# Patient Record
Sex: FEMALE | Race: BLACK OR AFRICAN AMERICAN | NOT HISPANIC OR LATINO | Employment: UNEMPLOYED | URBAN - METROPOLITAN AREA
[De-identification: names, ages, dates, MRNs, and addresses within clinical notes are randomized per-mention and may not be internally consistent; named-entity substitution may affect disease eponyms.]

---

## 2017-02-04 ENCOUNTER — COMMUNICATION - HEALTHEAST (OUTPATIENT)
Dept: INTERNAL MEDICINE | Facility: CLINIC | Age: 47
End: 2017-02-04

## 2017-02-09 ENCOUNTER — COMMUNICATION - HEALTHEAST (OUTPATIENT)
Dept: INTERNAL MEDICINE | Facility: CLINIC | Age: 47
End: 2017-02-09

## 2017-03-14 ENCOUNTER — OFFICE VISIT - HEALTHEAST (OUTPATIENT)
Dept: INTERNAL MEDICINE | Facility: CLINIC | Age: 47
End: 2017-03-14

## 2017-03-14 ENCOUNTER — COMMUNICATION - HEALTHEAST (OUTPATIENT)
Dept: INTERNAL MEDICINE | Facility: CLINIC | Age: 47
End: 2017-03-14

## 2017-03-14 DIAGNOSIS — J32.9 SINUSITIS: ICD-10-CM

## 2017-03-14 ASSESSMENT — MIFFLIN-ST. JEOR: SCORE: 1324.39

## 2017-04-26 ENCOUNTER — COMMUNICATION - HEALTHEAST (OUTPATIENT)
Dept: INTERNAL MEDICINE | Facility: CLINIC | Age: 47
End: 2017-04-26

## 2017-04-26 DIAGNOSIS — G47.00 INSOMNIA: ICD-10-CM

## 2017-05-24 ENCOUNTER — AMBULATORY - HEALTHEAST (OUTPATIENT)
Dept: INTERNAL MEDICINE | Facility: CLINIC | Age: 47
End: 2017-05-24

## 2017-05-24 DIAGNOSIS — Z79.899 ENCOUNTER FOR LONG-TERM (CURRENT) USE OF OTHER MEDICATIONS: ICD-10-CM

## 2017-05-24 DIAGNOSIS — E55.9 VITAMIN D DEFICIENCY: ICD-10-CM

## 2017-05-24 DIAGNOSIS — D64.9 ANEMIA, UNSPECIFIED: ICD-10-CM

## 2017-05-24 DIAGNOSIS — E78.5 HYPERLIPEMIA: ICD-10-CM

## 2017-05-25 ENCOUNTER — RECORDS - HEALTHEAST (OUTPATIENT)
Dept: MAMMOGRAPHY | Facility: CLINIC | Age: 47
End: 2017-05-25

## 2017-05-25 ENCOUNTER — OFFICE VISIT - HEALTHEAST (OUTPATIENT)
Dept: INTERNAL MEDICINE | Facility: CLINIC | Age: 47
End: 2017-05-25

## 2017-05-25 DIAGNOSIS — E55.9 VITAMIN D DEFICIENCY: ICD-10-CM

## 2017-05-25 DIAGNOSIS — D64.9 ANEMIA, UNSPECIFIED: ICD-10-CM

## 2017-05-25 DIAGNOSIS — Z79.899 ENCOUNTER FOR LONG-TERM (CURRENT) USE OF OTHER MEDICATIONS: ICD-10-CM

## 2017-05-25 DIAGNOSIS — E78.5 HYPERLIPEMIA: ICD-10-CM

## 2017-05-25 DIAGNOSIS — Z12.31 ENCOUNTER FOR SCREENING MAMMOGRAM FOR MALIGNANT NEOPLASM OF BREAST: ICD-10-CM

## 2017-05-25 DIAGNOSIS — F43.10 POSTTRAUMATIC STRESS DISORDER: ICD-10-CM

## 2017-05-25 DIAGNOSIS — Z00.00 ENCOUNTER FOR ANNUAL PHYSICAL EXAM: ICD-10-CM

## 2017-05-25 LAB
CHOLEST SERPL-MCNC: 201 MG/DL
FASTING STATUS PATIENT QL REPORTED: NO
HDLC SERPL-MCNC: 52 MG/DL
LDLC SERPL CALC-MCNC: 141 MG/DL
TRIGL SERPL-MCNC: 42 MG/DL

## 2017-05-25 ASSESSMENT — MIFFLIN-ST. JEOR: SCORE: 1302.16

## 2017-05-26 ENCOUNTER — COMMUNICATION - HEALTHEAST (OUTPATIENT)
Dept: INTERNAL MEDICINE | Facility: CLINIC | Age: 47
End: 2017-05-26

## 2017-06-23 ENCOUNTER — COMMUNICATION - HEALTHEAST (OUTPATIENT)
Dept: INTERNAL MEDICINE | Facility: CLINIC | Age: 47
End: 2017-06-23

## 2017-06-23 DIAGNOSIS — F32.A DEPRESSION: ICD-10-CM

## 2017-10-13 ENCOUNTER — OFFICE VISIT - HEALTHEAST (OUTPATIENT)
Dept: INTERNAL MEDICINE | Facility: CLINIC | Age: 47
End: 2017-10-13

## 2017-10-13 DIAGNOSIS — D50.9 IRON DEFICIENCY ANEMIA: ICD-10-CM

## 2017-10-13 ASSESSMENT — MIFFLIN-ST. JEOR: SCORE: 1307.15

## 2017-11-22 ENCOUNTER — COMMUNICATION - HEALTHEAST (OUTPATIENT)
Dept: SCHEDULING | Facility: CLINIC | Age: 47
End: 2017-11-22

## 2017-11-22 DIAGNOSIS — J32.9 SINUSITIS: ICD-10-CM

## 2017-11-29 ENCOUNTER — COMMUNICATION - HEALTHEAST (OUTPATIENT)
Dept: INTERNAL MEDICINE | Facility: CLINIC | Age: 47
End: 2017-11-29

## 2017-11-29 DIAGNOSIS — G47.00 INSOMNIA: ICD-10-CM

## 2018-04-16 ENCOUNTER — COMMUNICATION - HEALTHEAST (OUTPATIENT)
Dept: INTERNAL MEDICINE | Facility: CLINIC | Age: 48
End: 2018-04-16

## 2018-04-16 DIAGNOSIS — G47.00 INSOMNIA: ICD-10-CM

## 2018-06-08 ENCOUNTER — OFFICE VISIT - HEALTHEAST (OUTPATIENT)
Dept: INTERNAL MEDICINE | Facility: CLINIC | Age: 48
End: 2018-06-08

## 2018-06-08 DIAGNOSIS — R35.0 URINARY FREQUENCY: ICD-10-CM

## 2018-06-08 DIAGNOSIS — Z12.4 SCREENING FOR CERVICAL CANCER: ICD-10-CM

## 2018-06-08 DIAGNOSIS — D50.9 IRON DEFICIENCY ANEMIA: ICD-10-CM

## 2018-06-08 DIAGNOSIS — E78.5 DYSLIPIDEMIA: ICD-10-CM

## 2018-06-08 DIAGNOSIS — Z51.81 MEDICATION MONITORING ENCOUNTER: ICD-10-CM

## 2018-06-08 LAB
ALBUMIN UR-MCNC: NEGATIVE MG/DL
APPEARANCE UR: CLEAR
BACTERIA #/AREA URNS HPF: ABNORMAL HPF
BILIRUB UR QL STRIP: NEGATIVE
COLOR UR AUTO: YELLOW
GLUCOSE UR STRIP-MCNC: NEGATIVE MG/DL
HGB UR QL STRIP: ABNORMAL
KETONES UR STRIP-MCNC: NEGATIVE MG/DL
LEUKOCYTE ESTERASE UR QL STRIP: NEGATIVE
NITRATE UR QL: NEGATIVE
PH UR STRIP: 7 [PH] (ref 5–8)
RBC #/AREA URNS AUTO: ABNORMAL HPF
SP GR UR STRIP: 1.01 (ref 1–1.03)
SQUAMOUS #/AREA URNS AUTO: ABNORMAL LPF
UROBILINOGEN UR STRIP-ACNC: ABNORMAL
WBC #/AREA URNS AUTO: ABNORMAL HPF

## 2018-06-08 ASSESSMENT — MIFFLIN-ST. JEOR: SCORE: 1260.65

## 2018-06-13 ENCOUNTER — COMMUNICATION - HEALTHEAST (OUTPATIENT)
Dept: INTERNAL MEDICINE | Facility: CLINIC | Age: 48
End: 2018-06-13

## 2018-07-02 ENCOUNTER — COMMUNICATION - HEALTHEAST (OUTPATIENT)
Dept: INTERNAL MEDICINE | Facility: CLINIC | Age: 48
End: 2018-07-02

## 2018-07-02 DIAGNOSIS — F32.A DEPRESSION: ICD-10-CM

## 2018-07-13 ENCOUNTER — COMMUNICATION - HEALTHEAST (OUTPATIENT)
Dept: INTERNAL MEDICINE | Facility: CLINIC | Age: 48
End: 2018-07-13

## 2018-07-13 DIAGNOSIS — G47.00 INSOMNIA: ICD-10-CM

## 2018-07-18 ENCOUNTER — COMMUNICATION - HEALTHEAST (OUTPATIENT)
Dept: INTERNAL MEDICINE | Facility: CLINIC | Age: 48
End: 2018-07-18

## 2018-07-18 ENCOUNTER — COMMUNICATION - HEALTHEAST (OUTPATIENT)
Dept: SCHEDULING | Facility: CLINIC | Age: 48
End: 2018-07-18

## 2018-07-18 DIAGNOSIS — J01.90 ACUTE NON-RECURRENT SINUSITIS, UNSPECIFIED LOCATION: ICD-10-CM

## 2018-10-10 ENCOUNTER — COMMUNICATION - HEALTHEAST (OUTPATIENT)
Dept: INTERNAL MEDICINE | Facility: CLINIC | Age: 48
End: 2018-10-10

## 2018-10-11 ENCOUNTER — COMMUNICATION - HEALTHEAST (OUTPATIENT)
Dept: INTERNAL MEDICINE | Facility: CLINIC | Age: 48
End: 2018-10-11

## 2019-04-05 ENCOUNTER — COMMUNICATION - HEALTHEAST (OUTPATIENT)
Dept: INTERNAL MEDICINE | Facility: CLINIC | Age: 49
End: 2019-04-05

## 2019-04-05 DIAGNOSIS — G47.00 INSOMNIA: ICD-10-CM

## 2019-05-10 ENCOUNTER — AMBULATORY - HEALTHEAST (OUTPATIENT)
Dept: OTHER | Facility: CLINIC | Age: 49
End: 2019-05-10

## 2019-06-10 ENCOUNTER — AMBULATORY - HEALTHEAST (OUTPATIENT)
Dept: OTHER | Facility: CLINIC | Age: 49
End: 2019-06-10

## 2019-07-02 ENCOUNTER — COMMUNICATION - HEALTHEAST (OUTPATIENT)
Dept: INTERNAL MEDICINE | Facility: CLINIC | Age: 49
End: 2019-07-02

## 2019-07-02 DIAGNOSIS — F32.A DEPRESSION: ICD-10-CM

## 2019-07-02 DIAGNOSIS — G47.00 INSOMNIA: ICD-10-CM

## 2019-09-19 ENCOUNTER — DOCUMENTATION ONLY (OUTPATIENT)
Dept: OTHER | Facility: CLINIC | Age: 49
End: 2019-09-19

## 2019-09-19 ENCOUNTER — COMMUNICATION - HEALTHEAST (OUTPATIENT)
Dept: INTERNAL MEDICINE | Facility: CLINIC | Age: 49
End: 2019-09-19

## 2019-09-19 ENCOUNTER — AMBULATORY - HEALTHEAST (OUTPATIENT)
Dept: OTHER | Facility: CLINIC | Age: 49
End: 2019-09-19

## 2019-10-28 ENCOUNTER — OFFICE VISIT - HEALTHEAST (OUTPATIENT)
Dept: INTERNAL MEDICINE | Facility: CLINIC | Age: 49
End: 2019-10-28

## 2019-10-28 ENCOUNTER — RECORDS - HEALTHEAST (OUTPATIENT)
Dept: MAMMOGRAPHY | Facility: CLINIC | Age: 49
End: 2019-10-28

## 2019-10-28 DIAGNOSIS — Z12.11 COLON CANCER SCREENING: ICD-10-CM

## 2019-10-28 DIAGNOSIS — D50.0 IRON DEFICIENCY ANEMIA DUE TO CHRONIC BLOOD LOSS: ICD-10-CM

## 2019-10-28 DIAGNOSIS — D64.9 ANEMIA, UNSPECIFIED TYPE: ICD-10-CM

## 2019-10-28 DIAGNOSIS — Z12.11 SCREEN FOR COLON CANCER: ICD-10-CM

## 2019-10-28 DIAGNOSIS — Z00.00 MEDICARE ANNUAL WELLNESS VISIT, SUBSEQUENT: ICD-10-CM

## 2019-10-28 DIAGNOSIS — J01.90 ACUTE NON-RECURRENT SINUSITIS, UNSPECIFIED LOCATION: ICD-10-CM

## 2019-10-28 DIAGNOSIS — Z11.4 SCREENING FOR HIV (HUMAN IMMUNODEFICIENCY VIRUS): ICD-10-CM

## 2019-10-28 DIAGNOSIS — E78.00 HYPERCHOLESTEROLEMIA: ICD-10-CM

## 2019-10-28 DIAGNOSIS — Z12.31 ENCOUNTER FOR SCREENING MAMMOGRAM FOR MALIGNANT NEOPLASM OF BREAST: ICD-10-CM

## 2019-10-28 DIAGNOSIS — F33.42 MAJOR DEPRESSIVE DISORDER, RECURRENT EPISODE, IN FULL REMISSION (H): ICD-10-CM

## 2019-10-28 DIAGNOSIS — Z51.81 ENCOUNTER FOR THERAPEUTIC DRUG MONITORING: ICD-10-CM

## 2019-10-28 LAB
ALBUMIN SERPL-MCNC: 3.8 G/DL (ref 3.5–5)
ALBUMIN UR-MCNC: NEGATIVE MG/DL
ALP SERPL-CCNC: 60 U/L (ref 45–120)
ALT SERPL W P-5'-P-CCNC: 20 U/L (ref 0–45)
ANION GAP SERPL CALCULATED.3IONS-SCNC: 7 MMOL/L (ref 5–18)
APPEARANCE UR: CLEAR
AST SERPL W P-5'-P-CCNC: 17 U/L (ref 0–40)
BACTERIA #/AREA URNS HPF: ABNORMAL HPF
BILIRUB SERPL-MCNC: 0.2 MG/DL (ref 0–1)
BILIRUB UR QL STRIP: NEGATIVE
BUN SERPL-MCNC: 16 MG/DL (ref 8–22)
CALCIUM SERPL-MCNC: 8.8 MG/DL (ref 8.5–10.5)
CHLORIDE BLD-SCNC: 107 MMOL/L (ref 98–107)
CHOLEST SERPL-MCNC: 210 MG/DL
CO2 SERPL-SCNC: 27 MMOL/L (ref 22–31)
COLOR UR AUTO: YELLOW
CREAT SERPL-MCNC: 0.79 MG/DL (ref 0.6–1.1)
ERYTHROCYTE [DISTWIDTH] IN BLOOD BY AUTOMATED COUNT: 10.8 % (ref 11–14.5)
FASTING STATUS PATIENT QL REPORTED: NO
FERRITIN SERPL-MCNC: 126 NG/ML (ref 10–130)
GFR SERPL CREATININE-BSD FRML MDRD: >60 ML/MIN/1.73M2
GLUCOSE BLD-MCNC: 87 MG/DL (ref 70–125)
GLUCOSE UR STRIP-MCNC: NEGATIVE MG/DL
HCT VFR BLD AUTO: 37.4 % (ref 35–47)
HDLC SERPL-MCNC: 58 MG/DL
HGB BLD-MCNC: 12.3 G/DL (ref 12–16)
HGB UR QL STRIP: ABNORMAL
HIV 1+2 AB+HIV1 P24 AG SERPL QL IA: NEGATIVE
KETONES UR STRIP-MCNC: NEGATIVE MG/DL
LDLC SERPL CALC-MCNC: 131 MG/DL
LEUKOCYTE ESTERASE UR QL STRIP: NEGATIVE
MCH RBC QN AUTO: 29.6 PG (ref 27–34)
MCHC RBC AUTO-ENTMCNC: 32.9 G/DL (ref 32–36)
MCV RBC AUTO: 90 FL (ref 80–100)
NITRATE UR QL: NEGATIVE
PH UR STRIP: 5.5 [PH] (ref 5–8)
PLATELET # BLD AUTO: 319 THOU/UL (ref 140–440)
PMV BLD AUTO: 7 FL (ref 7–10)
POTASSIUM BLD-SCNC: 4.1 MMOL/L (ref 3.5–5)
PROT SERPL-MCNC: 7 G/DL (ref 6–8)
RBC # BLD AUTO: 4.16 MILL/UL (ref 3.8–5.4)
RBC #/AREA URNS AUTO: ABNORMAL HPF
SODIUM SERPL-SCNC: 141 MMOL/L (ref 136–145)
SP GR UR STRIP: >=1.03 (ref 1–1.03)
SQUAMOUS #/AREA URNS AUTO: ABNORMAL LPF
TRIGL SERPL-MCNC: 104 MG/DL
UROBILINOGEN UR STRIP-ACNC: ABNORMAL
WBC #/AREA URNS AUTO: ABNORMAL HPF
WBC: 3.3 THOU/UL (ref 4–11)

## 2019-10-28 ASSESSMENT — ANXIETY QUESTIONNAIRES
2. NOT BEING ABLE TO STOP OR CONTROL WORRYING: SEVERAL DAYS
1. FEELING NERVOUS, ANXIOUS, OR ON EDGE: SEVERAL DAYS
7. FEELING AFRAID AS IF SOMETHING AWFUL MIGHT HAPPEN: SEVERAL DAYS
GAD7 TOTAL SCORE: 7
3. WORRYING TOO MUCH ABOUT DIFFERENT THINGS: SEVERAL DAYS
6. BECOMING EASILY ANNOYED OR IRRITABLE: SEVERAL DAYS
IF YOU CHECKED OFF ANY PROBLEMS ON THIS QUESTIONNAIRE, HOW DIFFICULT HAVE THESE PROBLEMS MADE IT FOR YOU TO DO YOUR WORK, TAKE CARE OF THINGS AT HOME, OR GET ALONG WITH OTHER PEOPLE: NOT DIFFICULT AT ALL
4. TROUBLE RELAXING: SEVERAL DAYS
5. BEING SO RESTLESS THAT IT IS HARD TO SIT STILL: SEVERAL DAYS

## 2019-10-28 ASSESSMENT — MIFFLIN-ST. JEOR: SCORE: 1303.75

## 2019-10-28 ASSESSMENT — PATIENT HEALTH QUESTIONNAIRE - PHQ9: SUM OF ALL RESPONSES TO PHQ QUESTIONS 1-9: 6

## 2019-10-29 ENCOUNTER — COMMUNICATION - HEALTHEAST (OUTPATIENT)
Dept: INTERNAL MEDICINE | Facility: CLINIC | Age: 49
End: 2019-10-29

## 2019-10-31 ENCOUNTER — COMMUNICATION - HEALTHEAST (OUTPATIENT)
Dept: INTERNAL MEDICINE | Facility: CLINIC | Age: 49
End: 2019-10-31

## 2020-02-06 ENCOUNTER — COMMUNICATION - HEALTHEAST (OUTPATIENT)
Dept: SCHEDULING | Facility: CLINIC | Age: 50
End: 2020-02-06

## 2020-02-06 ENCOUNTER — COMMUNICATION - HEALTHEAST (OUTPATIENT)
Dept: INTERNAL MEDICINE | Facility: CLINIC | Age: 50
End: 2020-02-06

## 2020-02-06 ENCOUNTER — OFFICE VISIT - HEALTHEAST (OUTPATIENT)
Dept: INTERNAL MEDICINE | Facility: CLINIC | Age: 50
End: 2020-02-06

## 2020-02-06 DIAGNOSIS — R09.81 CONGESTION OF PARANASAL SINUS: ICD-10-CM

## 2020-02-06 ASSESSMENT — PATIENT HEALTH QUESTIONNAIRE - PHQ9: SUM OF ALL RESPONSES TO PHQ QUESTIONS 1-9: 1

## 2020-02-06 ASSESSMENT — MIFFLIN-ST. JEOR: SCORE: 1312.82

## 2020-02-13 ENCOUNTER — COMMUNICATION - HEALTHEAST (OUTPATIENT)
Dept: INTERNAL MEDICINE | Facility: CLINIC | Age: 50
End: 2020-02-13

## 2020-02-13 DIAGNOSIS — J01.90 ACUTE NON-RECURRENT SINUSITIS, UNSPECIFIED LOCATION: ICD-10-CM

## 2020-07-02 ENCOUNTER — COMMUNICATION - HEALTHEAST (OUTPATIENT)
Dept: INTERNAL MEDICINE | Facility: CLINIC | Age: 50
End: 2020-07-02

## 2020-07-02 DIAGNOSIS — G47.00 INSOMNIA: ICD-10-CM

## 2020-07-02 DIAGNOSIS — F32.A DEPRESSION: ICD-10-CM

## 2020-10-02 ENCOUNTER — COMMUNICATION - HEALTHEAST (OUTPATIENT)
Dept: INTERNAL MEDICINE | Facility: CLINIC | Age: 50
End: 2020-10-02

## 2020-10-02 DIAGNOSIS — F32.A DEPRESSION: ICD-10-CM

## 2020-10-23 ENCOUNTER — COMMUNICATION - HEALTHEAST (OUTPATIENT)
Dept: INTERNAL MEDICINE | Facility: CLINIC | Age: 50
End: 2020-10-23

## 2020-10-23 DIAGNOSIS — F32.A DEPRESSION: ICD-10-CM

## 2020-11-03 ENCOUNTER — COMMUNICATION - HEALTHEAST (OUTPATIENT)
Dept: INTERNAL MEDICINE | Facility: CLINIC | Age: 50
End: 2020-11-03

## 2020-11-03 DIAGNOSIS — F32.A DEPRESSION: ICD-10-CM

## 2020-12-15 ENCOUNTER — OFFICE VISIT - HEALTHEAST (OUTPATIENT)
Dept: INTERNAL MEDICINE | Facility: CLINIC | Age: 50
End: 2020-12-15

## 2020-12-15 DIAGNOSIS — G47.00 INSOMNIA: ICD-10-CM

## 2020-12-15 DIAGNOSIS — F33.42 MAJOR DEPRESSIVE DISORDER, RECURRENT EPISODE, IN FULL REMISSION (H): ICD-10-CM

## 2020-12-15 RX ORDER — TRAZODONE HYDROCHLORIDE 100 MG/1
300 TABLET ORAL AT BEDTIME
Qty: 270 TABLET | Refills: 3 | Status: SHIPPED | OUTPATIENT
Start: 2020-12-15 | End: 2021-12-31

## 2021-01-07 ENCOUNTER — COMMUNICATION - HEALTHEAST (OUTPATIENT)
Dept: INTERNAL MEDICINE | Facility: CLINIC | Age: 51
End: 2021-01-07

## 2021-01-07 DIAGNOSIS — F32.A DEPRESSION: ICD-10-CM

## 2021-01-08 RX ORDER — VENLAFAXINE HYDROCHLORIDE 150 MG/1
CAPSULE, EXTENDED RELEASE ORAL
Qty: 90 CAPSULE | Refills: 3 | Status: SHIPPED | OUTPATIENT
Start: 2021-01-08 | End: 2022-01-11

## 2021-02-02 ENCOUNTER — OFFICE VISIT - HEALTHEAST (OUTPATIENT)
Dept: INTERNAL MEDICINE | Facility: CLINIC | Age: 51
End: 2021-02-02

## 2021-02-02 DIAGNOSIS — D50.9 IRON DEFICIENCY ANEMIA, UNSPECIFIED IRON DEFICIENCY ANEMIA TYPE: ICD-10-CM

## 2021-02-02 DIAGNOSIS — F51.01 PRIMARY INSOMNIA: ICD-10-CM

## 2021-02-02 DIAGNOSIS — J01.91 ACUTE RECURRENT SINUSITIS, UNSPECIFIED LOCATION: ICD-10-CM

## 2021-02-02 DIAGNOSIS — Z51.81 ENCOUNTER FOR THERAPEUTIC DRUG MONITORING: ICD-10-CM

## 2021-02-02 DIAGNOSIS — Z12.31 VISIT FOR SCREENING MAMMOGRAM: ICD-10-CM

## 2021-02-02 DIAGNOSIS — E78.00 HYPERCHOLESTEROLEMIA: ICD-10-CM

## 2021-02-02 DIAGNOSIS — F43.10 POSTTRAUMATIC STRESS DISORDER: ICD-10-CM

## 2021-02-02 DIAGNOSIS — Z12.11 SCREENING FOR COLON CANCER: ICD-10-CM

## 2021-02-02 DIAGNOSIS — Z00.00 MEDICARE ANNUAL WELLNESS VISIT, SUBSEQUENT: ICD-10-CM

## 2021-02-02 DIAGNOSIS — F33.42 MAJOR DEPRESSIVE DISORDER, RECURRENT EPISODE, IN FULL REMISSION (H): ICD-10-CM

## 2021-02-02 DIAGNOSIS — E55.9 VITAMIN D DEFICIENCY: ICD-10-CM

## 2021-02-02 LAB
ALBUMIN SERPL-MCNC: 4.1 G/DL (ref 3.5–5)
ALBUMIN UR-MCNC: NEGATIVE MG/DL
ALP SERPL-CCNC: 76 U/L (ref 45–120)
ALT SERPL W P-5'-P-CCNC: 37 U/L (ref 0–45)
ANION GAP SERPL CALCULATED.3IONS-SCNC: 9 MMOL/L (ref 5–18)
APPEARANCE UR: CLEAR
AST SERPL W P-5'-P-CCNC: 26 U/L (ref 0–40)
BACTERIA #/AREA URNS HPF: ABNORMAL HPF
BILIRUB SERPL-MCNC: 0.2 MG/DL (ref 0–1)
BILIRUB UR QL STRIP: NEGATIVE
BUN SERPL-MCNC: 7 MG/DL (ref 8–22)
CALCIUM SERPL-MCNC: 9 MG/DL (ref 8.5–10.5)
CHLORIDE BLD-SCNC: 107 MMOL/L (ref 98–107)
CHOLEST SERPL-MCNC: 231 MG/DL
CO2 SERPL-SCNC: 24 MMOL/L (ref 22–31)
COLOR UR AUTO: YELLOW
CREAT SERPL-MCNC: 0.82 MG/DL (ref 0.6–1.1)
ERYTHROCYTE [DISTWIDTH] IN BLOOD BY AUTOMATED COUNT: 12.4 % (ref 11–14.5)
FASTING STATUS PATIENT QL REPORTED: NO
FERRITIN SERPL-MCNC: 121 NG/ML (ref 10–130)
GFR SERPL CREATININE-BSD FRML MDRD: >60 ML/MIN/1.73M2
GLUCOSE BLD-MCNC: 81 MG/DL (ref 70–125)
GLUCOSE UR STRIP-MCNC: NEGATIVE MG/DL
HCT VFR BLD AUTO: 41.1 % (ref 35–47)
HDLC SERPL-MCNC: 51 MG/DL
HGB BLD-MCNC: 12.8 G/DL (ref 12–16)
HGB UR QL STRIP: NEGATIVE
KETONES UR STRIP-MCNC: NEGATIVE MG/DL
LDLC SERPL CALC-MCNC: 148 MG/DL
LEUKOCYTE ESTERASE UR QL STRIP: NEGATIVE
MCH RBC QN AUTO: 28.4 PG (ref 27–34)
MCHC RBC AUTO-ENTMCNC: 31.1 G/DL (ref 32–36)
MCV RBC AUTO: 91 FL (ref 80–100)
MUCOUS THREADS #/AREA URNS LPF: ABNORMAL LPF
NITRATE UR QL: NEGATIVE
PH UR STRIP: 5.5 [PH] (ref 5–8)
PLATELET # BLD AUTO: 337 THOU/UL (ref 140–440)
PMV BLD AUTO: 9 FL (ref 7–10)
POTASSIUM BLD-SCNC: 3.9 MMOL/L (ref 3.5–5)
PROT SERPL-MCNC: 7.8 G/DL (ref 6–8)
RBC # BLD AUTO: 4.5 MILL/UL (ref 3.8–5.4)
RBC #/AREA URNS AUTO: ABNORMAL HPF
SODIUM SERPL-SCNC: 140 MMOL/L (ref 136–145)
SP GR UR STRIP: >=1.03 (ref 1–1.03)
SQUAMOUS #/AREA URNS AUTO: ABNORMAL LPF
TRIGL SERPL-MCNC: 160 MG/DL
UROBILINOGEN UR STRIP-ACNC: ABNORMAL
WBC #/AREA URNS AUTO: ABNORMAL HPF
WBC: 3.4 THOU/UL (ref 4–11)

## 2021-02-02 ASSESSMENT — MIFFLIN-ST. JEOR: SCORE: 1417.37

## 2021-02-03 LAB — 25(OH)D3 SERPL-MCNC: 29.4 NG/ML (ref 30–80)

## 2021-02-04 ENCOUNTER — COMMUNICATION - HEALTHEAST (OUTPATIENT)
Dept: INTERNAL MEDICINE | Facility: CLINIC | Age: 51
End: 2021-02-04

## 2021-05-11 ENCOUNTER — RECORDS - HEALTHEAST (OUTPATIENT)
Dept: ADMINISTRATIVE | Facility: OTHER | Age: 51
End: 2021-05-11

## 2021-05-26 ASSESSMENT — PATIENT HEALTH QUESTIONNAIRE - PHQ9: SUM OF ALL RESPONSES TO PHQ QUESTIONS 1-9: 6

## 2021-05-27 ASSESSMENT — PATIENT HEALTH QUESTIONNAIRE - PHQ9: SUM OF ALL RESPONSES TO PHQ QUESTIONS 1-9: 1

## 2021-05-27 NOTE — TELEPHONE ENCOUNTER
Refill Approved    Rx renewed per Medication Renewal Policy. Medication was last renewed on 7/13/18.    Maria A Green, [facility] Triage/Med Refill 4/5/2019     Requested Prescriptions   Pending Prescriptions Disp Refills     traZODone (DESYREL) 100 MG tablet [Pharmacy Med Name: TRAZODONE HCL 100MG TABS] 180 tablet 2     Sig: TAKE 2 TABLETS BY MOUTH DAILY AT BEDTIME    Tricyclics/Misc Antidepressant/Antianxiety Meds Refill Protocol Passed - 4/5/2019  3:18 AM       Passed - PCP or prescribing provider visit in last year    Last office visit with prescriber/PCP: 10/13/2017 Ronnie Bishop MD OR same dept: Visit date not found OR same specialty: 10/13/2017 Ronnie Bishop MD  Last physical: 6/8/2018 Last MTM visit: Visit date not found   Next visit within 3 mo: Visit date not found  Next physical within 3 mo: Visit date not found  Prescriber OR PCP: Ronnie Bishop MD  Last diagnosis associated with med order: 1. Insomnia  - traZODone (DESYREL) 100 MG tablet [Pharmacy Med Name: TRAZODONE HCL 100MG TABS]; TAKE 2 TABLETS BY MOUTH DAILY AT BEDTIME  Dispense: 180 tablet; Refill: 2    If protocol passes may refill for 12 months if within 3 months of last provider visit (or a total of 15 months).

## 2021-05-28 ASSESSMENT — ANXIETY QUESTIONNAIRES: GAD7 TOTAL SCORE: 7

## 2021-05-30 VITALS — BODY MASS INDEX: 29.98 KG/M2 | HEIGHT: 62 IN | WEIGHT: 162.9 LBS

## 2021-05-30 VITALS — BODY MASS INDEX: 29.11 KG/M2 | WEIGHT: 164.3 LBS | HEIGHT: 63 IN

## 2021-05-30 NOTE — TELEPHONE ENCOUNTER
RN cannot approve Refill Request    RN can NOT refill this medication Protocol failed and NO refill given.      Maria A Green, [facility] Triage/Med Refill 7/2/2019    Requested Prescriptions   Pending Prescriptions Disp Refills     venlafaxine (EFFEXOR-XR) 150 MG 24 hr capsule [Pharmacy Med Name: VENLAFAXINE HCL ER 150MG CP24] 90 capsule 3     Sig: TAKE ONE CAPSULE BY MOUTH EVERY DAY       Venlafaxine/Desvenlafaxine Refill Protocol Failed - 7/2/2019  3:17 AM        Failed - LFT or AST or ALT in last year     Albumin   Date Value Ref Range Status   05/25/2017 3.9 3.5 - 5.0 g/dL Final     Bilirubin, Total   Date Value Ref Range Status   05/25/2017 0.3 0.0 - 1.0 mg/dL Final     Alkaline Phosphatase   Date Value Ref Range Status   05/25/2017 57 45 - 120 U/L Final     AST   Date Value Ref Range Status   05/25/2017 19 0 - 40 U/L Final     ALT   Date Value Ref Range Status   05/25/2017 18 0 - 45 U/L Final     Protein, Total   Date Value Ref Range Status   05/25/2017 7.3 6.0 - 8.0 g/dL Final                Failed - Fasting lipid cascade in last year     Cholesterol   Date Value Ref Range Status   05/25/2017 201 (H) <=199 mg/dL Final     Triglycerides   Date Value Ref Range Status   05/25/2017 42 <=149 mg/dL Final     HDL Cholesterol   Date Value Ref Range Status   05/25/2017 52 >=50 mg/dL Final     LDL Calculated   Date Value Ref Range Status   05/25/2017 141 (H) <=129 mg/dL Final     Patient Fasting > 8hrs?   Date Value Ref Range Status   05/25/2017 No  Final             Failed - PCP or prescribing provider visit in last year     Last office visit with prescriber/PCP: 10/13/2017 Ronnie Bishop MD OR same dept: Visit date not found OR same specialty: 10/13/2017 Ronnie Bishop MD  Last physical: 6/8/2018 Last MTM visit: Visit date not found   Next visit within 3 mo: Visit date not found  Next physical within 3 mo: Visit date not found  Prescriber OR PCP: Ronnie Bishop MD  Last diagnosis associated with med order:  1. Depression  - venlafaxine (EFFEXOR-XR) 150 MG 24 hr capsule [Pharmacy Med Name: VENLAFAXINE HCL ER 150MG CP24]; TAKE ONE CAPSULE BY MOUTH EVERY DAY  Dispense: 90 capsule; Refill: 3    2. Insomnia  - traZODone (DESYREL) 100 MG tablet [Pharmacy Med Name: TRAZODONE HCL 100MG TABS]; TAKE 2 TABLETS BY MOUTH DAILY AT BEDTIME  Dispense: 180 tablet; Refill: 0    If protocol passes may refill for 12 months if within 3 months of last provider visit (or a total of 15 months).             Failed - Blood Pressure in last year     BP Readings from Last 1 Encounters:   06/08/18 108/80             traZODone (DESYREL) 100 MG tablet [Pharmacy Med Name: TRAZODONE HCL 100MG TABS] 180 tablet 0     Sig: TAKE 2 TABLETS BY MOUTH DAILY AT BEDTIME       Tricyclics/Misc Antidepressant/Antianxiety Meds Refill Protocol Failed - 7/2/2019  3:17 AM        Failed - PCP or prescribing provider visit in last year     Last office visit with prescriber/PCP: 10/13/2017 Ronnie Bishop MD OR same dept: Visit date not found OR same specialty: 10/13/2017 Ronnie Bishop MD  Last physical: 6/8/2018 Last MTM visit: Visit date not found   Next visit within 3 mo: Visit date not found  Next physical within 3 mo: Visit date not found  Prescriber OR PCP: Ronnie Bishop MD  Last diagnosis associated with med order: 1. Depression  - venlafaxine (EFFEXOR-XR) 150 MG 24 hr capsule [Pharmacy Med Name: VENLAFAXINE HCL ER 150MG CP24]; TAKE ONE CAPSULE BY MOUTH EVERY DAY  Dispense: 90 capsule; Refill: 3    2. Insomnia  - traZODone (DESYREL) 100 MG tablet [Pharmacy Med Name: TRAZODONE HCL 100MG TABS]; TAKE 2 TABLETS BY MOUTH DAILY AT BEDTIME  Dispense: 180 tablet; Refill: 0    If protocol passes may refill for 12 months if within 3 months of last provider visit (or a total of 15 months).

## 2021-05-31 VITALS — HEIGHT: 62 IN | BODY MASS INDEX: 30.18 KG/M2 | WEIGHT: 164 LBS

## 2021-06-01 VITALS — WEIGHT: 155.5 LBS | HEIGHT: 62 IN | BODY MASS INDEX: 28.61 KG/M2

## 2021-06-01 NOTE — TELEPHONE ENCOUNTER
Pt having skin revision , end of October and pt  Asking, is she can take this,  ARNICA vitamins, with her anti-depressants?    Please call pt back  [phone] MICAELA

## 2021-06-02 NOTE — PROGRESS NOTES
Assessment and Plan:     1. Medicare annual wellness visit, subsequent  Minnie is only 48 but has been on Medicare due to major depression and posttraumatic stress disorder.  He scores 4 on Mini-Mental and does not appear to have cognitive deficits.  Her health risk assessment was reviewed.  She is independent in activities of daily living.  She does have a healthcare directive.    2. Recurrent Major Depression In Full Remission  She is treated with Effexor X are 150 mg daily with trazodone 200 mg at bedtime.  Feels she is doing fairly well on this regimen.    3. Hypercholesterolemia  Nonfasting lipid cascade will be checked.  She has no known issues with atherosclerotic disease  - Lipid Cascade    4. Anemia, unspecified type  She has been using an iron supplement.  She still is menstruating.  Hemogram and ferritin level will be checked  - HM2(CBC w/o Differential)  - Ferritin    5. Encounter for therapeutic drug monitoring  Are checked as outlined  - Comprehensive Metabolic Panel  - Urinalysis-UC if Indicated    6. Screen for colon cancer  Options for colon cancer screening were discussed including Cologuard and colonoscopy.  After discussion, she is interested in colonoscopy  - Ambulatory referral for Colonoscopy      8. Screening for HIV (human immunodeficiency virus)  HIV screening is done per recent health maintenance recommendations  - HIV Antigen/Antibody Screening [place]    9. Acute non-recurrent sinusitis, unspecified location  She requests a refill of amoxicillin to have on hand since she states she develops recurrent sinusitis.  She was advised to reserve this only for more severe symptoms.  The distinction between viral and bacterial infections was reviewed  - amoxicillin (AMOXIL) 500 MG capsule; TAKE ONE CAPSULE BY MOUTH THREE TIMES A DAY FOR 10 DAYS  Dispense: 30 capsule; Refill: 0    10. Iron deficiency anemia due to chronic blood loss  As noted above, she is taking an iron supplement.  A ferritin  level will be checked.  If this is in the high normal range, I would advise stopping the iron supplement  - Ferritin    Plan:  1.  Mammogram as planned.  Check done last year, this would not be due for several years  2.  The patient's current medical problems were reviewed.  3.  Baseline colonoscopy is recommended  4.  She will be notified of laboratory test results  5.  Continue current medications  6.  See in 1 year or as needed.  The following health maintenance schedule was reviewed with the patient and provided in printed form in the after visit summary:   Health Maintenance   Topic Date Due     HIV SCREENING  12/21/1983     DEPRESSION FOLLOW UP  11/25/2017     MAMMOGRAM  05/25/2018     COLONOSCOPY  12/21/2018     MEDICARE ANNUAL WELLNESS VISIT  06/08/2019     ZOSTER VACCINES (1 of 2) 12/21/2018     HPV TEST  05/25/2021     TD 18+ HE  04/23/2023     PAP SMEAR  06/08/2023     ADVANCE CARE PLANNING  09/19/2024     TDAP ADULT ONE TIME DOSE  Completed        Subjective:   HPI:  Chief Complaint: Minnie Aguilar is an 48 y.o. female here for an Annual Wellness visit. Today Minnie is present with no new health concerns. She notes that her mood is doing well, and she is compliant with her Effexor. Minnie is taking trazodone for her sleeping and that helps though it could be better. She drinks a lot of caffeine and needs to work on that. She notes that she believes she is starting to have a sinus infection due to feeling nasal drip in her head and the appearance of yellow mucus. She would like an amoxicillin prescription to knock out the infection before it starts.     Health Maintenance: Colonoscopy discussed. Pap smear up to date. Mammogram scheduled.   Review of Systems:    Admits to alcohol use, nasal drip, mucous.  Denies smoking.  Please see above.  The rest of the review of systems are negative for all systems.    PFSH:  Minnie uses [employer] as well as a medical taxis for health visits.     Patient Care  Team:  Ronnie Bishop MD as PCP - General (Internal Medicine)  Ronnie Bishop MD as Assigned PCP     Patient Active Problem List   Diagnosis     Hypercholesterolemia     Recurrent Major Depression In Full Remission     Vitamin D Deficiency     Post-traumatic Stress Disorder     Anemia     Insomnia     Anxiety disorder     Enlarged uterus     Past Medical History:   Diagnosis Date     Anemia      Anxiety      Depression      Hyperlipidemia      Leiomyoma of uterus      PTSD (post-traumatic stress disorder)      Restless leg syndrome      Vitamin D deficiency       Past Surgical History:   Procedure Laterality Date     EXPLORATORY LAPAROTOMY N/A 7/7/2014    Procedure: POSSIBLE LAPAROTOMY;  Surgeon: Kavin Silveira MD;  Location: [facility];  Service:      MYOMECTOMY  2005     SD MYOMECTOMY 1-4,W/TOT 250GMS/<,ABD APPRCH      Description: Uterine Myomectomy;  Proc Date: 04/14/2004;     SD MYOMECTOMY 5/>,TOT>250 GMS,ABD APPRCH N/A 11/13/2014    Procedure: LAPAROTOMY MYOMECTOMY;  Surgeon: Kavin Silveira MD;  Location: [facility];  Service: Gynecology      Family History   Problem Relation Age of Onset     Early death Mother      Hodgkin's lymphoma Mother 18        cause of death     Vision loss Father      Mental illness Maternal Grandmother      Alcohol abuse Maternal Grandfather      Diabetes Maternal Grandfather      Stroke Maternal Grandfather      Cancer Paternal Uncle      Breast cancer Neg Hx       Social History     Socioeconomic History     Marital status: Single     Spouse name: Not on file     Number of children: Not on file     Years of education: Not on file     Highest education level: Not on file   Occupational History     Not on file   Social Needs     Financial resource strain: Not on file     Food insecurity:     Worry: Not on file     Inability: Not on file     Transportation needs:     Medical: Not on file     Non-medical: Not on file   Tobacco Use     Smoking status: Never  "Smoker     Smokeless tobacco: Never Used   Substance and Sexual Activity     Alcohol use: Yes     Comment: occasional      Drug use: No     Sexual activity: Not on file   Lifestyle     Physical activity:     Days per week: Not on file     Minutes per session: Not on file     Stress: Not on file   Relationships     Social connections:     Talks on phone: Not on file     Gets together: Not on file     Attends Tenriism service: Not on file     Active member of club or organization: Not on file     Attends meetings of clubs or organizations: Not on file     Relationship status: Not on file     Intimate partner violence:     Fear of current or ex partner: Not on file     Emotionally abused: Not on file     Physically abused: Not on file     Forced sexual activity: Not on file   Other Topics Concern     Not on file   Social History Narrative     Not on file      Current Outpatient Medications   Medication Sig Dispense Refill     amoxicillin (AMOXIL) 500 MG capsule TAKE ONE CAPSULE BY MOUTH THREE TIMES A DAY FOR 10 DAYS 30 capsule 0     ascorbic acid (VITAMIN C) 1000 MG tablet Take 1,000 mg by mouth daily after supper.        biotin 10 mg Tab Take 1 tablet by mouth daily after supper.        cholecalciferol, vitamin D3, (VITAMIN D3) 2,000 unit cap Take 1 tablet by mouth daily after supper.        FERROUS FUMARATE (IRON ORAL) Take 325 mg by mouth 2 (two) times a day with meals. TABS        MULTIVITAMIN (MULTIPLE VITAMIN ORAL) Take 1 tablet by mouth daily after supper.        traZODone (DESYREL) 100 MG tablet TAKE 2 TABLETS BY MOUTH DAILY AT BEDTIME 180 tablet 3     venlafaxine (EFFEXOR-XR) 150 MG 24 hr capsule TAKE ONE CAPSULE BY MOUTH EVERY DAY 90 capsule 3     No current facility-administered medications for this visit.       Objective:   Vital Signs:   Visit Vitals  /68 (Patient Site: Left Arm, Patient Position: Sitting, Cuff Size: Adult Regular)   Pulse 92   Ht 5' 1.5\" (1.562 m)   Wt 165 lb (74.8 kg)   LMP " 10/18/2019   SpO2 98%   BMI 30.67 kg/m         VisionScreening:  No exam data present     PHYSICAL EXAM  Constitutional:   Reveals alert talkative woman. Affect appropriate. Does not appear acutely ill.   Vitals: per nursing notes.  HEENT: Bandage incision on forehead.   Ears:  External canals, TMs clear.    Eyes:  EOMs full, PERRL.  Oropharynx:   Mouth and throat clear, no thrush or exudate.  Neck:  Supple, no carotid bruits or adenopathy.  Back:  No spine or CVA pain.  Thorax:  No bony deformities.  Lungs: Clear to A&P without rales or wheezes.  Respiratory effort normal.  Cardiac:   Regular rate and rhythm, normal S1, S2, no murmur or gallop.  Breasts:   No masses, no axillary adenopathy.  Abdomen:  Soft, moderately obese. Active bowel sounds without bruits, mass, or tenderness. Lower abdominal midline scar. Femoral pulses in tact.  : Deferred   Extremities:  No joint deformities No peripheral edema, pulses in the feet intact.    Skin: Clear   Neuro:  Alert and oriented. Cranial nerves, motor, sensory exams are intact.  No gross focal deficits.  Psychiatric:  Memory intact, mood appropriate.      Assessment Results 10/28/2019   Activities of Daily Living No help needed   Instrumental Activities of Daily Living No help needed   Get Up and Go Score Less than 12 seconds   Mini Cog Total Score 4   Some recent data might be hidden     A Mini-Cog score of 0-2 suggests the possibility of dementia, score of 3-5 suggests no dementia    Identified Health Risks:     The patient was counseled and encouraged to consider modifying their diet and eating habits. She was provided with information on recommended healthy diet options.    The patient was provided with written information regarding signs of hearing loss.  Information on urinary incontinence and treatment options given to patient.    The patient's PHQ-9 score is consistent with mild depression. She was provided with information regarding depression and was advised  to schedule a follow up appointment in 12 weeks to further address this issue symptoms remain troublesome.    Patient's advanced directive was discussed and I am comfortable with the patient's wishes.    ADDITIONAL HISTORY SUMMARIZED (2): None.  DECISION TO OBTAIN EXTRA INFORMATION (1): None.   RADIOLOGY TESTS (1): None.  LABS (1): Labs reviewed and ordered.  MEDICINE TESTS (1): None.  INDEPENDENT REVIEW (2 each): None.       The visit lasted a total of 19 minutes face to face with the patient. Over 50% of the time was spent counseling and educating the patient about annual wellness.    I, Wei Mehta, am scribing for and in the presence of, Dr. Bishop.    I, Dr. Ronnie Bishop, personally performed the services described in this documentation, as scribed by Wei Mehta in my presence, and it is both accurate and complete.    Total data points: 1

## 2021-06-02 NOTE — TELEPHONE ENCOUNTER
Patient informed of results and recommendations because results letter can't mail with HIV result.

## 2021-06-03 VITALS
SYSTOLIC BLOOD PRESSURE: 118 MMHG | DIASTOLIC BLOOD PRESSURE: 68 MMHG | HEART RATE: 92 BPM | BODY MASS INDEX: 30.36 KG/M2 | OXYGEN SATURATION: 98 % | WEIGHT: 165 LBS | HEIGHT: 62 IN

## 2021-06-04 VITALS
DIASTOLIC BLOOD PRESSURE: 78 MMHG | OXYGEN SATURATION: 98 % | HEIGHT: 62 IN | BODY MASS INDEX: 30.73 KG/M2 | HEART RATE: 82 BPM | WEIGHT: 167 LBS | SYSTOLIC BLOOD PRESSURE: 112 MMHG

## 2021-06-05 VITALS
SYSTOLIC BLOOD PRESSURE: 118 MMHG | HEART RATE: 98 BPM | WEIGHT: 188.3 LBS | DIASTOLIC BLOOD PRESSURE: 74 MMHG | HEIGHT: 62 IN | BODY MASS INDEX: 34.65 KG/M2

## 2021-06-05 NOTE — TELEPHONE ENCOUNTER
Jean Paul Adhikari,  I saw this pt today. She was demanding abx for sinus infection but on exam had only mild nasal congestion and Sx/signs of sinusitis.  She was very upset for not getting abx.  I expect you will get an earful from her tomorrow.    Cielo

## 2021-06-05 NOTE — TELEPHONE ENCOUNTER
Sinus infection    Is chronic per pt    symptoms started last week  No fever  Dark yellow mucus  Dry scatchy throat  Fatigued  Does not get pain with sinus infex  Course of antibiotics in Oct 2019  helped for sinus infx    PCP not available. Pt agrees to see other provider  Warm transfer to  for appointment.     Pt wants to be seen  Maria A Ortega RN  [facility] Nurse Advisor    Reason for Disposition    [1] Sinus congestion as part of a cold AND [2] present < 10 days     Pt wants to be seen    Protocols used: SINUS PAIN OR CONGESTION-A-AH

## 2021-06-05 NOTE — PROGRESS NOTES
[facility] Follow Up Note    Assessment/Plan:    1. Congestion of paranasal sinus  Clinically patient does not appear to have sinus infection.  She had no history of preceding upper respiratory infection sore throat or cough and currently has no pain over her sinuses and able to breathe through her nose fairly frequently.  Patient is demanding amoxicillin.  Discussed that I do not think it is appropriate at the moment.  I recommended decongestants with Allegra and Flonase.  Patient was very upset with me and mentions that she will take Sudafed over-the-counter as she stormed out of the room.    Kristin Blackwell MD    Chief Complaint:  Chief Complaint   Patient presents with     Sinus Problem     stuffing nose, itchy throat and dark yellow mucus       History of Present Illness:  Minnie is a 49 y.o. female , pt of Dr Bishop, has history of depression, hyperlipidemia, anemia, vitamin D deficiency and PTSD.  She is currently here with complaint of sinus congestion.    Patient reports that she developed dry scratchy throat a week ago and started having thick nasal mucus and congestion.  She reports that she gets this once a year and thinks that it is sinus infection and would like to have amoxicillin prescription.  However currently she denies any sinus pain, jaw pain, ear pain, fevers or chills.  On exam she is able to breathe through her nose fairly freely.  Previously she has used over-the-counter Sudafed but has not taking it recently.  She denies any history of seasonal allergies.    Review of Systems:  A comprehensive review of systems was performed and was otherwise negative    PFSH:  Social History: Viewed  Social History     Tobacco Use   Smoking Status Never Smoker   Smokeless Tobacco Never Used     Social History     Social History Narrative     Not on file       Past History: Reviewed  Current Outpatient Medications   Medication Sig Dispense Refill     amoxicillin (AMOXIL) 500 MG capsule TAKE  "ONE CAPSULE BY MOUTH THREE TIMES A DAY FOR 10 DAYS 30 capsule 0     ascorbic acid (VITAMIN C) 1000 MG tablet Take 1,000 mg by mouth daily after supper.        biotin 10 mg Tab Take 1 tablet by mouth daily after supper.        cholecalciferol, vitamin D3, (VITAMIN D3) 2,000 unit cap Take 1 tablet by mouth daily after supper.        FERROUS FUMARATE (IRON ORAL) Take 325 mg by mouth 2 (two) times a day with meals. TABS        MULTIVITAMIN (MULTIPLE VITAMIN ORAL) Take 1 tablet by mouth daily after supper.        traZODone (DESYREL) 100 MG tablet TAKE 2 TABLETS BY MOUTH DAILY AT BEDTIME 180 tablet 3     venlafaxine (EFFEXOR-XR) 150 MG 24 hr capsule TAKE ONE CAPSULE BY MOUTH EVERY DAY 90 capsule 3     No current facility-administered medications for this visit.        Family History: Viewed    Physical Exam:    Vitals:    02/06/20 1402   BP: 112/78   Patient Site: Left Arm   Patient Position: Sitting   Cuff Size: Adult Large   Pulse: 82   SpO2: 98%   Weight: 167 lb (75.8 kg)   Height: 5' 1.5\" (1.562 m)     Wt Readings from Last 3 Encounters:   02/06/20 167 lb (75.8 kg)   10/28/19 165 lb (74.8 kg)   06/08/18 155 lb 8 oz (70.5 kg)     Body mass index is 31.04 kg/m .    Constitutional:  Reveals a  female.  Vitals:  Per nursing notes.  HEENT:No cervical LAD, no thyromegaly,  conjunctiva is pink, no scleral icterus, TMs are visualized and normal bl, oropharynx is clear, no exudates, slight nasal mucosal congestion noted but she is able to breathe through her nose freely, no sinus tenderness to palpation.  Cardiac:  Regular rate and rhythm,no murmurs, rubs, or gallops.Legs without edema. Palpation of the radial pulse regular.  Lungs: Clear to auscultation bl.  Respiratory effort normal.  Psychiatric: Patient was easily agitated when I told her that I do not think she needs antibiotics.  Her speech is rapid but not pressured.    Data Review:    Analysis and Summary of Old Records (2): yes      Records Requested (1): no  "     Other History Summarized (from other people in the room) (2): no    Radiology Tests Summarized (XRAY/CT/MRI/DXA) (1): no    Labs Reviewed (1): no    Medicine Tests Reviewed (EKG/ECHO/COLONOSCOPY/EGD) (1): no    Independent Review of EKG or X-RAY (2): no

## 2021-06-06 NOTE — TELEPHONE ENCOUNTER
Refill Request  Did you contact pharmacy: No  Medication name:   Requested Prescriptions     Pending Prescriptions Disp Refills     amoxicillin (AMOXIL) 500 MG capsule 30 capsule 0     Sig: TAKE ONE CAPSULE BY MOUTH THREE TIMES A DAY FOR 10 DAYS     Who prescribed the medication: Ronnie Bishop MD   Requested Pharmacy: [facility]  Is patient out of medication: n/a  Patient notified refills processed in 3 business days:  yes  Okay to leave a detailed message: yes

## 2021-06-06 NOTE — TELEPHONE ENCOUNTER
Pt states that she has a sinus infection   Pt states that her sinuses wont clear up with out Amoxicillin   Pt states that she had a bad cough last week but the cough is better this week  No Fever   Just nasal congestion and sinus pressure for about 2 weeks  Pt states sudafed did not help

## 2021-06-06 NOTE — TELEPHONE ENCOUNTER
RN cannot approve Refill Request    RN can NOT refill this medication med is not covered by policy/route to provider. Last office visit: 10/13/2017 Ronnie Bishop MD Last Physical: 10/28/2019 Last MTM visit: Visit date not found Last visit same specialty: 2/6/2020 Kristin Blackwell MD.  Next visit within 3 mo: Visit date not found  Next physical within 3 mo: Visit date not found      Shobha Simon, [facility] Triage/Med Refill 2/15/2020    Requested Prescriptions   Pending Prescriptions Disp Refills     amoxicillin (AMOXIL) 500 MG capsule 30 capsule 0     Sig: TAKE ONE CAPSULE BY MOUTH THREE TIMES A DAY FOR 10 DAYS       There is no refill protocol information for this order

## 2021-06-09 NOTE — PROGRESS NOTES
"Minnie Aguilar  1970      Assessment and Plan:  1. Sinusitis- (per pt strong request-amoxicillin sent in)      Chief Complaint: sinus infection    Visit diagnoses:    1. Sinusitis  amoxicillin (AMOXIL) 500 MG capsule       Meds:  Current Outpatient Prescriptions   Medication Sig Dispense Refill     amoxicillin (AMOXIL) 500 MG capsule TAKE ONE CAPSULE BY MOUTH THREE TIMES A DAY 30 capsule 0     ascorbic acid (VITAMIN C) 1000 MG tablet Take 1,000 mg by mouth daily after supper.        biotin 10 mg Tab Take 1 tablet by mouth daily after supper.        cholecalciferol, vitamin D3, (VITAMIN D3) 2,000 unit cap Take 1 tablet by mouth daily after supper.        DOCOSAHEXANOIC ACID/EPA (FISH OIL ORAL) Take 1,200 mg by mouth daily after supper.       FERROUS FUMARATE (IRON ORAL) TABS       hydrocortisone 1 % cream Apply 1 application topically daily as needed (for rash on arm). OTC med       MULTIVITAMIN (MULTIPLE VITAMIN ORAL) Take 1 tablet by mouth daily after supper.        traZODone (DESYREL) 100 MG tablet TAKE TWO TABLETS BY MOUTH AT BEDTIME 60 tablet 6     TRAZODONE HCL (TRAZODONE ORAL) Take 200 mg by mouth bedtime.       venlafaxine (EFFEXOR-XR) 150 MG 24 hr capsule TAKE ONE CAPSULE BY MOUTH EVERY DAY 90 capsule 3     No current facility-administered medications for this visit.        No Known Allergies    ROS: complete review of symptoms otherwise negative except as noted below    S:  Hx sinus infections. Facial pain. Congestion. No fever ,amoxicillin always works. Hx of vertigo but no problem now       O:   Vitals:    03/14/17 1420   BP: 124/84   Patient Site: Left Arm   Patient Position: Sitting   Cuff Size: Adult Regular   Pulse: (!) 106   Temp: 97.8  F (36.6  C)   TempSrc: Oral   Weight: 164 lb 4.8 oz (74.5 kg)   Height: 5' 3\" (1.6 m)       Physical Exam:  General-  VS- see above  HEENT- neg ; TMs partially seen. Narrow canals   Neck- no adenopathy/thyromegaly/bruits  Chest- clear         Max Dennis " MD

## 2021-06-09 NOTE — TELEPHONE ENCOUNTER
Refill Approved    Rx renewed per Medication Renewal Policy. Medication was last renewed on 7/2/19.    Maria A Green, [facility] Triage/Med Refill 7/3/2020     Requested Prescriptions   Pending Prescriptions Disp Refills     traZODone (DESYREL) 100 MG tablet [Pharmacy Med Name: TRAZODONE 100MG TABLETS] 180 tablet 3     Sig: TAKE TWO TABLETS BY MOUTH EVERY DAY AT BEDTIME       Tricyclics/Misc Antidepressant/Antianxiety Meds Refill Protocol Passed - 7/2/2020  3:42 AM        Passed - PCP or prescribing provider visit in last year     Last office visit with prescriber/PCP: 10/13/2017 Ronnie Bishop MD OR same dept: 2/6/2020 Kristin Blackwell MD OR same specialty: 2/6/2020 Kristin Blackwell MD  Last physical: 10/28/2019 Last MTM visit: Visit date not found   Next visit within 3 mo: Visit date not found  Next physical within 3 mo: Visit date not found  Prescriber OR PCP: Ronnie Bishop MD  Last diagnosis associated with med order: 1. Insomnia  - traZODone (DESYREL) 100 MG tablet [Pharmacy Med Name: TRAZODONE 100MG TABLETS]; TAKE TWO TABLETS BY MOUTH EVERY DAY AT BEDTIME  Dispense: 180 tablet; Refill: 3    2. Depression  - venlafaxine (EFFEXOR-XR) 150 MG 24 hr capsule [Pharmacy Med Name: VENLAFAXINE 150MG ER CAPSULES]; TAKE ONE CAPSULE BY MOUTH EVERY DAY  Dispense: 90 capsule; Refill: 3    If protocol passes may refill for 12 months if within 3 months of last provider visit (or a total of 15 months).                venlafaxine (EFFEXOR-XR) 150 MG 24 hr capsule [Pharmacy Med Name: VENLAFAXINE 150MG ER CAPSULES] 90 capsule 3     Sig: TAKE ONE CAPSULE BY MOUTH EVERY DAY       Venlafaxine/Desvenlafaxine Refill Protocol Passed - 7/2/2020  3:42 AM        Passed - LFT or AST or ALT in last year     Albumin   Date Value Ref Range Status   10/28/2019 3.8 3.5 - 5.0 g/dL Final     Bilirubin, Total   Date Value Ref Range Status   10/28/2019 0.2 0.0 - 1.0 mg/dL Final     Alkaline Phosphatase   Date Value Ref Range Status    10/28/2019 60 45 - 120 U/L Final     AST   Date Value Ref Range Status   10/28/2019 17 0 - 40 U/L Final     ALT   Date Value Ref Range Status   10/28/2019 20 0 - 45 U/L Final     Protein, Total   Date Value Ref Range Status   10/28/2019 7.0 6.0 - 8.0 g/dL Final                Passed - Fasting lipid cascade in last year     Cholesterol   Date Value Ref Range Status   10/28/2019 210 (H) <=199 mg/dL Final     Triglycerides   Date Value Ref Range Status   10/28/2019 104 <=149 mg/dL Final     HDL Cholesterol   Date Value Ref Range Status   10/28/2019 58 >=50 mg/dL Final     LDL Calculated   Date Value Ref Range Status   10/28/2019 131 (H) <=129 mg/dL Final     Patient Fasting > 8hrs?   Date Value Ref Range Status   10/28/2019 No  Final             Passed - PCP or prescribing provider visit in last year     Last office visit with prescriber/PCP: 10/13/2017 Ronnie Bishop MD OR same dept: 2/6/2020 Kristin Blackwell MD OR same specialty: 2/6/2020 Kristin Blackwell MD  Last physical: 10/28/2019 Last MTM visit: Visit date not found   Next visit within 3 mo: Visit date not found  Next physical within 3 mo: Visit date not found  Prescriber OR PCP: Ronnie Bishop MD  Last diagnosis associated with med order: 1. Insomnia  - traZODone (DESYREL) 100 MG tablet [Pharmacy Med Name: TRAZODONE 100MG TABLETS]; TAKE TWO TABLETS BY MOUTH EVERY DAY AT BEDTIME  Dispense: 180 tablet; Refill: 3    2. Depression  - venlafaxine (EFFEXOR-XR) 150 MG 24 hr capsule [Pharmacy Med Name: VENLAFAXINE 150MG ER CAPSULES]; TAKE ONE CAPSULE BY MOUTH EVERY DAY  Dispense: 90 capsule; Refill: 3    If protocol passes may refill for 12 months if within 3 months of last provider visit (or a total of 15 months).             Passed - Blood Pressure in last year     BP Readings from Last 1 Encounters:   02/06/20 112/78

## 2021-06-10 NOTE — PROGRESS NOTES
Preventive Care Visit    Assessment and Plan:    1. Encounter for annual physical exam     2. Hyperlipemia     3. Vitamin D deficiency     4. Post-traumatic Stress Disorder     5. Anemia         Patient Instructions   1. Medications were reviewed and medication refills completed if requested.   A corrected Medication List is listed below on this After Visit    Summary.   New Medications, Refilled medications or Discontinued medications are also listed below.      2. Immunizations were reviewed and updated as necessary.   All up to date  3. If labs were done today, they will either be mailed to you or released to My Chart online if that has been activated.  4. See Rossana Vazquez at [facility] for repeat PAP smear    5. Mammogram today  6. Colonoscopy at age 48 as per insurance coverage      7. Body mass index is 29.79 kg/(m^2). All patients with a BMI >24.99 were counseled re possible weight loss, nutrition, and regular aereobic exercise.  The following high BMI interventions were performed this visit: encouragement to exercise and weight loss from baseline weight        1. Encounter for annual physical exam     2. Hyperlipemia     3. Vitamin D deficiency     4. Post-traumatic Stress Disorder     5. Anemia             Indra Branch MD  Internal Medicine & Travel Medicine  [address] [address]  Voice: [phone]  Fax: [phone]    ++++++++++++++++++++++++++++++++++++++  Minnie ROSEMARY PeraltaLauren   46 y.o.  female    Date of visit: 5/25/2017      Patient Profile:   Social History     Social History Narrative       Patient Active Problem List   Diagnosis     Hyperlipemia     Recurrent Major Depression In Full Remission     Restless Legs Syndrome     Vitamin D Deficiency     Post-traumatic Stress Disorder     Anemia     Insomnia     Anxiety disorder     Enlarged uterus       HPI:  1. Health Maintenance  -immunizations are up-to-date.  She cannot get a colonoscopy until  age 48 as her insurance will not pay for it.    2. PTSD/depression -she did a PHQ 9 today and scored 1.  She thinks that her depressive problems under excellent control.    3. Anemia  -her history of iron deficiency anemia which has been improving in recent years.  Course part of that is due to the fact that she has had generally less menstrual bleeding.  However in the recent past she had a couple weeks of solid bleeding so recheck on blood count and iron studies today.    4. Uterine fibroids/ASCUS - 2013 she had a normal Paps.  2016 she had ASCUS.  It is essential that she go back and see the nurse midwife for repeat Pap smear and pelvic    5. Vit D deficiency -she is generally been repleted but will check a today per    Review of systems:     General: NEG for fever or chills. NEG for weight loss. NEG for fatigue.  Head: NEG for Headache. NEG for head injury.  Eyes: NEG for cataracts. NEG for glaucoma. NEG for visual problems.  ENT:  NEG for Rhinitis. NEG for epistaxis.  Chest:  NEG for cough. NEG for wheezing/asthma.  Cardiac: NEG for chest pain or palpitations. NEG for WAGNER or orthopnea.  GI: NEG for diarrhea. NEG for constipation  : NEG for overactive bladder. NEG for incontinence.  Musculoskeletal: NEG for myalgias. NEG for significant joint pains.  Neuro: NEG for migraine headaches. NEG for weakness or numbness. NEG for memory loss.  Endocrine: NEG for polydipsia or polyphagia.  Psych: NEG for significant anxiety. NEG for depression.       Current Outpatient Prescriptions on File Prior to Visit   Medication Sig     amoxicillin (AMOXIL) 500 MG capsule TAKE ONE CAPSULE BY MOUTH THREE TIMES A DAY     ascorbic acid (VITAMIN C) 1000 MG tablet Take 1,000 mg by mouth daily after supper.      biotin 10 mg Tab Take 1 tablet by mouth daily after supper.      cholecalciferol, vitamin D3, (VITAMIN D3) 2,000 unit cap Take 1 tablet by mouth daily after supper.      DOCOSAHEXANOIC ACID/EPA (FISH OIL ORAL) Take 1,200 mg by  mouth daily after supper.     FERROUS FUMARATE (IRON ORAL) TABS     hydrocortisone 1 % cream Apply 1 application topically daily as needed (for rash on arm). OTC med     MULTIVITAMIN (MULTIPLE VITAMIN ORAL) Take 1 tablet by mouth daily after supper.      traZODone (DESYREL) 100 MG tablet TAKE TWO TABLETS BY MOUTH AT BEDTIME     TRAZODONE HCL (TRAZODONE ORAL) Take 200 mg by mouth bedtime.     venlafaxine (EFFEXOR-XR) 150 MG 24 hr capsule TAKE ONE CAPSULE BY MOUTH EVERY DAY     No current facility-administered medications on file prior to visit.        No Known Allergies      Medications, allergies, and problem list were reviewed and updated    Past Medical History:   Diagnosis Date     Anemia      Anxiety      Depression      Hyperlipidemia      Leiomyoma of uterus      PTSD (post-traumatic stress disorder)      Restless leg syndrome      Vitamin D deficiency      Past Surgical History:   Procedure Laterality Date     EXPLORATORY LAPAROTOMY N/A 7/7/2014    Procedure: POSSIBLE LAPAROTOMY;  Surgeon: Kavin Silveira MD;  Location: [facility];  Service:      MYOMECTOMY  2005     ME MYOMECTOMY 1-4,W/TOT 250GMS/<,ABD APPRC      Description: Uterine Myomectomy;  Proc Date: 04/14/2004;     ME MYOMECTOMY 5/>,TOT>250 GMS,ABD APPRCH N/A 11/13/2014    Procedure: LAPAROTOMY MYOMECTOMY;  Surgeon: Kavin Silveira MD;  Location: [facility];  Service: Gynecology     Social History     Social History     Marital status: Single     Spouse name: N/A     Number of children: N/A     Years of education: N/A     Occupational History     Not on file.     Social History Main Topics     Smoking status: Never Smoker     Smokeless tobacco: Not on file     Alcohol use Yes      Comment: occasional      Drug use: No     Sexual activity: Not on file     Other Topics Concern     Not on file     Social History Narrative     Family History   Problem Relation Age of Onset     Cancer Mother      Early death Mother      Vision loss  "Father      Mental illness Maternal Grandmother      Alcohol abuse Maternal Grandfather      Diabetes Maternal Grandfather      Stroke Maternal Grandfather      Breast cancer Neg Hx        Current Outpatient Prescriptions   Medication Sig Dispense Refill     amoxicillin (AMOXIL) 500 MG capsule TAKE ONE CAPSULE BY MOUTH THREE TIMES A DAY 30 capsule 0     ascorbic acid (VITAMIN C) 1000 MG tablet Take 1,000 mg by mouth daily after supper.        biotin 10 mg Tab Take 1 tablet by mouth daily after supper.        cholecalciferol, vitamin D3, (VITAMIN D3) 2,000 unit cap Take 1 tablet by mouth daily after supper.        DOCOSAHEXANOIC ACID/EPA (FISH OIL ORAL) Take 1,200 mg by mouth daily after supper.       FERROUS FUMARATE (IRON ORAL) TABS       hydrocortisone 1 % cream Apply 1 application topically daily as needed (for rash on arm). OTC med       MULTIVITAMIN (MULTIPLE VITAMIN ORAL) Take 1 tablet by mouth daily after supper.        traZODone (DESYREL) 100 MG tablet TAKE TWO TABLETS BY MOUTH AT BEDTIME 60 tablet 6     TRAZODONE HCL (TRAZODONE ORAL) Take 200 mg by mouth bedtime.       venlafaxine (EFFEXOR-XR) 150 MG 24 hr capsule TAKE ONE CAPSULE BY MOUTH EVERY DAY 90 capsule 3     No current facility-administered medications for this visit.        No Known Allergies    Physical Exam:    General Appearance:   Well-developed, well-nourished, and oriented female in no acute distress.  /70 (Patient Site: Left Arm, Patient Position: Sitting, Cuff Size: Adult Regular)  Pulse (!) 102  Ht 5' 2\" (1.575 m)  Wt 162 lb 14.4 oz (73.9 kg)  BMI 29.79 kg/m2    EYES: Eyelids, conjunctiva, and sclera were normal. Pupils were normal.  HEAD, EARS, NOSE, MOUTH, AND THROAT: Head and face were normal. Hearing was normal to voice and the ears were normal to exam. Nose appearance was normal and there was no discharge. Oropharynx was normal.  NECK: Neck appearance was normal. There was no cervical adenopathy.  RESPIRATORY: Breathing " pattern was normal and the chest moved symmetrically.  Lung sounds were normal and there were no abnormal sounds.  CARDIOVASCULAR: Heart rate and rhythm were normal.  S1 and S2 were normal and there were no extra sounds or murmurs. Peripheral pulses in arms and legs were normal.    GASTROINTESTINAL: The abdomen was obese in contour.  Bowel sounds were present.  No hepatomegaly or splenomegaly. No localized tenderness.  BREASTS: Large pendulous breasts but no adenopathy or breast masses were palpable.  MUSCULOSKELETAL: Skeletal configuration was normal and muscle mass was normal for age. Joint appearance was overall normal.    SKIN/HAIR/NAILS: Skin color was normal.  There were no skin lesions.  Hair and nails were normal.  EXTREMITIES: No peripheral edema. DP/PT pulses were normal.  NEUROLOGIC: The patient was alert and oriented to person, place, time, and circumstance. Speech was normal. Cranial nerves were normal. Motor strength was normal for age. The patient was normally coordinated.  PSYCHIATRIC:  Mood and affect were normal and the patient had normal recent and remote memory. The patient's judgment and insight were normal.            Indra Branch MD    Certificate in Travel [facility]   Internal Medicine & Travel Medicine  [address] [address]  Voice: [phone]  Fax: [phone]

## 2021-06-12 NOTE — TELEPHONE ENCOUNTER
Refill Approved    Rx renewed per Medication Renewal Policy. Medication was last renewed on 7/3/20.    Maria A Green, [facility] Triage/Med Refill 10/5/2020     Requested Prescriptions   Pending Prescriptions Disp Refills     venlafaxine (EFFEXOR-XR) 150 MG 24 hr capsule [Pharmacy Med Name: VENLAFAXINE 150MG ER CAPSULES] 90 capsule 0     Sig: TAKE ONE CAPSULE BY MOUTH EVERY DAY       Venlafaxine/Desvenlafaxine Refill Protocol Passed - 10/2/2020  3:42 AM        Passed - LFT or AST or ALT in last year     Albumin   Date Value Ref Range Status   10/28/2019 3.8 3.5 - 5.0 g/dL Final     Bilirubin, Total   Date Value Ref Range Status   10/28/2019 0.2 0.0 - 1.0 mg/dL Final     Alkaline Phosphatase   Date Value Ref Range Status   10/28/2019 60 45 - 120 U/L Final     AST   Date Value Ref Range Status   10/28/2019 17 0 - 40 U/L Final     ALT   Date Value Ref Range Status   10/28/2019 20 0 - 45 U/L Final     Protein, Total   Date Value Ref Range Status   10/28/2019 7.0 6.0 - 8.0 g/dL Final                Passed - Fasting lipid cascade in last year     Cholesterol   Date Value Ref Range Status   10/28/2019 210 (H) <=199 mg/dL Final     Triglycerides   Date Value Ref Range Status   10/28/2019 104 <=149 mg/dL Final     HDL Cholesterol   Date Value Ref Range Status   10/28/2019 58 >=50 mg/dL Final     LDL Calculated   Date Value Ref Range Status   10/28/2019 131 (H) <=129 mg/dL Final     Patient Fasting > 8hrs?   Date Value Ref Range Status   10/28/2019 No  Final             Passed - PCP or prescribing provider visit in last year     Last office visit with prescriber/PCP: 10/13/2017 Ronnie Bishop MD OR same dept: 2/6/2020 Kristin Blackwell MD OR same specialty: 2/6/2020 Kristin Blackwell MD  Last physical: 10/28/2019 Last MTM visit: Visit date not found   Next visit within 3 mo: Visit date not found  Next physical within 3 mo: Visit date not found  Prescriber OR PCP: Ronnie Bishop MD  Last diagnosis associated with  med order: 1. Depression  - venlafaxine (EFFEXOR-XR) 150 MG 24 hr capsule [Pharmacy Med Name: VENLAFAXINE 150MG ER CAPSULES]; TAKE ONE CAPSULE BY MOUTH EVERY DAY  Dispense: 90 capsule; Refill: 0    If protocol passes may refill for 12 months if within 3 months of last provider visit (or a total of 15 months).             Passed - Blood Pressure in last year     BP Readings from Last 1 Encounters:   02/06/20 112/78

## 2021-06-12 NOTE — TELEPHONE ENCOUNTER
Refill Approved    Rx renewed per Medication Renewal Policy. Medication was last renewed on 10/5/20, last OV 2/6/20.    Shobha Simon, [facility] Triage/Med Refill 10/25/2020     Requested Prescriptions   Pending Prescriptions Disp Refills     venlafaxine (EFFEXOR-XR) 150 MG 24 hr capsule 30 capsule 0     Sig: Take 1 capsule (150 mg total) by mouth daily.       Venlafaxine/Desvenlafaxine Refill Protocol Passed - 10/23/2020  1:25 PM        Passed - LFT or AST or ALT in last year     Albumin   Date Value Ref Range Status   10/28/2019 3.8 3.5 - 5.0 g/dL Final     Bilirubin, Total   Date Value Ref Range Status   10/28/2019 0.2 0.0 - 1.0 mg/dL Final     Alkaline Phosphatase   Date Value Ref Range Status   10/28/2019 60 45 - 120 U/L Final     AST   Date Value Ref Range Status   10/28/2019 17 0 - 40 U/L Final     ALT   Date Value Ref Range Status   10/28/2019 20 0 - 45 U/L Final     Protein, Total   Date Value Ref Range Status   10/28/2019 7.0 6.0 - 8.0 g/dL Final                Passed - Fasting lipid cascade in last year     Cholesterol   Date Value Ref Range Status   10/28/2019 210 (H) <=199 mg/dL Final     Triglycerides   Date Value Ref Range Status   10/28/2019 104 <=149 mg/dL Final     HDL Cholesterol   Date Value Ref Range Status   10/28/2019 58 >=50 mg/dL Final     LDL Calculated   Date Value Ref Range Status   10/28/2019 131 (H) <=129 mg/dL Final     Patient Fasting > 8hrs?   Date Value Ref Range Status   10/28/2019 No  Final             Passed - PCP or prescribing provider visit in last year     Last office visit with prescriber/PCP: 10/13/2017 Ronnie Bishop MD OR same dept: 2/6/2020 Kristin Blackwell MD OR same specialty: 2/6/2020 Kristin Blackwell MD  Last physical: 10/28/2019 Last MTM visit: Visit date not found   Next visit within 3 mo: Visit date not found  Next physical within 3 mo: Visit date not found  Prescriber OR PCP: Ronnie Bishop MD  Last diagnosis associated with med order: 1.  Depression  - venlafaxine (EFFEXOR-XR) 150 MG 24 hr capsule; Take 1 capsule (150 mg total) by mouth daily.  Dispense: 30 capsule; Refill: 0    If protocol passes may refill for 12 months if within 3 months of last provider visit (or a total of 15 months).             Passed - Blood Pressure in last year     BP Readings from Last 1 Encounters:   02/06/20 112/78

## 2021-06-13 NOTE — PROGRESS NOTES
ASSESSMENT:  1.  Assume medical care:  Minnie previously was followed by Dr. Branch.  Her labs and note from May 25 were reviewed.  She has no major acute health concerns.    2.  Recurrent major depression in remission: She scores 2 on PHQ 9 and feels that she is doing well.  She remains on Effexor.  She has been on this for many years.  Reports that it also helps with posttraumatic stress disorder.  She does not wish to change the dose.    3.  Insomnia: Doing well with trazodone    4.  History of iron deficiency anemia: She is taking an iron supplement twice daily.  Her last ferritin level was at the high end of normal.  I would favor lowering the iron dose.  She reports that she is a Jehovah's witness and is worried about anything that could potentially increase risks for anemia.    PLAN:  1.  Decrease iron supplement 1 tab daily.  Ferritin and hemogram will be checked at next visit  2.  Flu shot was advised but declined.  3.  Continue current medications.  See next May for yearly exam    Medications Discontinued During This Encounter   Medication Reason     TRAZODONE HCL (TRAZODONE ORAL) Duplicate order       No Follow-up on file.    ASSESSED PROBLEMS:  1. Iron deficiency anemia  CANCELED: Ferritin       CHIEF COMPLAINT:  Chief Complaint   Patient presents with     Establish Care     no other concerns       HISTORY OF PRESENT ILLNESS:  Minnie is a 46 y.o. female presenting to the clinic today to establish care. She is a former Dr. Branch patient. She does not have any acute concerns today.     Anxiety/Depression: She continues to take 150 mg of Effexor daily, which works well. Her PHQ-9 is 2 today. She has been taking Effexor for 20 years and has not tried going off of it. She takes it for both anxiety and depression but more for depression. She suffers from PTSD.     Anemia: She has been taking an iron supplement on and off since she was 13 years old. She continues to take it regularly now. Her last ferritin level  "was 117 in May 2017. She has gone back and forth between taking one and two pills daily, but she is currently taking two per day.     Insomnia: She does not sleep particularly.well. She takes trazodone, which helps, but she still does not get much rest. She admits she drinks too much caffeine, but she only drinks it in the morning.     Health Maintenance: She does not want the flu shot.     REVIEW OF SYSTEMS:   She gets frequent sinus infections. She has never had asthma or other lung problems. She would like to get pregnant and inquires if her medications pose any risk. If she gets pregnant, she will need a . All other systems are negative.    PFSH:  She wants to have a baby. She drinks a lot of coffee in the morning. She has had surgery for fibroids in the past.     TOBACCO USE:  History   Smoking Status     Never Smoker   Smokeless Tobacco     Not on file       VITALS:  Vitals:    10/13/17 1205   BP: 112/82   Patient Site: Left Arm   Patient Position: Sitting   Cuff Size: Adult Regular   Pulse: 100   Weight: 164 lb (74.4 kg)   Height: 5' 2\" (1.575 m)     Wt Readings from Last 3 Encounters:   10/13/17 164 lb (74.4 kg)   17 162 lb 14.4 oz (73.9 kg)   17 164 lb 4.8 oz (74.5 kg)     Body mass index is 30 kg/(m^2).    PHYSICAL EXAM:  Constitutional:   Reveals an alert, pleasant, talkative woman. Affect appropriate. Does not appear acutely ill.  Vitals: per nursing notes.  HEENT: Eyes: No conjunctival hyperemia, EOMs full, PERRL.  Oropharynx:   Mouth and throat clear, no thrush or exudate.  Neck:  Supple, no carotid bruits or adenopathy.  Back:  No spine or CVA pain.  Thorax:  No bony deformities.  Lungs: Clear to A&P without rales or wheezes.  Respiratory effort normal.  Cardiac:  Tachycardic with rate slightly over 100, regular rhythm no murmur or gallop.  Abdomen:  Soft, active bowel sounds without bruits, mass, or tenderness.  Extremities:   No peripheral edema, pulses in the feet intact.  "   Skin:  Clear  Neuro:  Alert and oriented.  No gross focal deficits.  Psychiatric:  Memory intact, mood appropriate.  PHQ-9: 2    ADDITIONAL HISTORY SUMMARIZED (2): Reviewed 5/25/2017 Dr. Branch note regarding medications.   DECISION TO OBTAIN EXTRA INFORMATION (1): None.   RADIOLOGY TESTS (1): None.  LABS (1): Labs from May 2017 reviewed.   MEDICINE TESTS (1): None.  INDEPENDENT REVIEW (2 each): None.     The visit lasted a total of 17 minutes face to face with the patient. Over 50% of the time was spent counseling and educating the patient about her medications.    Frankie DEUTSCH, am scribing for and in the presence of, Dr. Bishop.    MILLIE DEUTSCH, personally performed the services described in this documentation, as scribed by Farnkie Ferris in my presence, and it is both accurate and complete.    Dragon dictation was used for this note.  Speech recognition errors are a possibility.    MEDICATIONS:  Current Outpatient Prescriptions   Medication Sig Dispense Refill     amoxicillin (AMOXIL) 500 MG capsule TAKE ONE CAPSULE BY MOUTH THREE TIMES A DAY 30 capsule 0     ascorbic acid (VITAMIN C) 1000 MG tablet Take 1,000 mg by mouth daily after supper.        biotin 10 mg Tab Take 1 tablet by mouth daily after supper.        cholecalciferol, vitamin D3, (VITAMIN D3) 2,000 unit cap Take 1 tablet by mouth daily after supper.        DOCOSAHEXANOIC ACID/EPA (FISH OIL ORAL) Take 1,200 mg by mouth daily after supper.       FERROUS FUMARATE (IRON ORAL) Take 325 mg by mouth 2 (two) times a day with meals. TABS        hydrocortisone 1 % cream Apply 1 application topically daily as needed (for rash on arm). OTC med       MULTIVITAMIN (MULTIPLE VITAMIN ORAL) Take 1 tablet by mouth daily after supper.        traZODone (DESYREL) 100 MG tablet TAKE TWO TABLETS BY MOUTH AT BEDTIME 60 tablet 6     venlafaxine (EFFEXOR-XR) 150 MG 24 hr capsule TAKE ONE CAPSULE BY MOUTH EVERY DAY 90 capsule 3     No current facility-administered  medications for this visit.        Total data points: 3

## 2021-06-13 NOTE — PATIENT INSTRUCTIONS - HE
1.  Increase trazodone to 300 mg at bedtime.  Report effect on insomnia in 1 month    2.   Continue Effexor  mg in the AM    3.  Flu shot is advised    4.  Screening mammogram    5.  Annual wellness exam in the future

## 2021-06-13 NOTE — PROGRESS NOTES
"Minnie Aguilar is a 49 y.o. female who is being evaluated via a billable telephone visit.      The patient has been notified of following:     \"This telephone visit will be conducted via a call between you and your physician/provider. We have found that certain health care needs can be provided without the need for a physical exam.  This service lets us provide the care you need with a short phone conversation.  If a prescription is necessary we can send it directly to your pharmacy.  If lab work is needed we can place an order for that and you can then stop by our lab to have the test done at a later time.    Telephone visits are billed at different rates depending on your insurance coverage. During this emergency period, for some insurers they may be billed the same as an in-person visit.  Please reach out to your insurance provider with any questions.    If during the course of the call the physician/provider feels a telephone visit is not appropriate, you will not be charged for this service.\"    Patient has given verbal consent to a Telephone visit? Yes    What phone number would you like to be contacted at? [phone]    Patient would like to receive their AVS by AVS Preference: Mail a copy.    Additional provider notes:  ASSESSMENT:  1.  Insomnia:  Minnie currently takes 200 mg of trazodone at bedtime.  She has noted some worsening problems with insomnia with frequent awakening.  She denies morning hangover or adverse effects from the trazodone.  She would be interested in increasing the dose.  After discussion this will be increased to 300 mg    2.  Major depression in remission:   she takes Effexor  mg in the a.m.  She feels her depressive symptoms are currently under good control.    .  Health maintenance:   influenza vaccine is advised.  Screening mammogram also was encouraged      PLAN:  1 increase trazodone to 300 mg at bedtime.. Report effect on insomnia in 1 month    2.  Continue Effexor XR " 150 mg in the a.m.    3.  Flu shot is advised.  Covid vaccine when available.    4.  Screening mammogram is advised      5.  She last had an annual wellness exam in October 2019.  A follow-up is recommended      Medications Discontinued During This Encounter   Medication Reason     traZODone (DESYREL) 100 MG tablet          ASSESSED PROBLEMS:  1. Insomnia  traZODone (DESYREL) 100 MG tablet   2. Major depressive disorder, recurrent episode, in full remission (H)         CHIEF COMPLAINT:  Chief Complaint   Patient presents with     Medication Problem     Would like Trazodone increased, just getting naps in       HISTORY OF PRESENT ILLNESS:  Minnie is a 49 y.o. female who presents by telephone complaining of insomnia.  She has had chronic issues with this which has been worse.  She is taking trazodone 200 mg at bedtime.  She denies any adverse effects from this, but wonders if a higher dose could be considered.  She denies morning hangover she is on Effexor XR for depression.  She feels depressive symptoms are under good control.  She otherwise feels well.  Last general exam was in October 2019.    REVIEW OF SYSTEMS:    Comprehensive review of systems is negative.    PFSH:  Single.  Lives independently.  She has been on disability    TOBACCO USE:  Social History     Tobacco Use   Smoking Status Never Smoker   Smokeless Tobacco Never Used       VITALS:  There were no vitals filed for this visit.  Wt Readings from Last 3 Encounters:   02/06/20 167 lb (75.8 kg)   10/28/19 165 lb (74.8 kg)   06/08/18 155 lb 8 oz (70.5 kg)       PHYSICAL EXAM:  Constitutional:   Reveals an alert talkative woman with appropriate affect.  She does not seem in acute distress.   Vitals: per nursing notes..  Psychiatric:  Memory intact, mood appropriate.  .    The visit lasted a total of 15 minutes face to face with the patient. Over 50% of the time was spent counseling and educating the patient about the management of insomnia      Dragon  dictation was used for this note. Speech recognition errors are a possibility.     MEDICATIONS:  Current Outpatient Medications   Medication Sig Dispense Refill     amoxicillin (AMOXIL) 500 MG capsule TAKE ONE CAPSULE BY MOUTH THREE TIMES A DAY FOR 10 DAYS 30 capsule 0     ascorbic acid (VITAMIN C) 1000 MG tablet Take 1,000 mg by mouth daily after supper.        biotin 10 mg Tab Take 1 tablet by mouth daily after supper.        cholecalciferol, vitamin D3, (VITAMIN D3) 2,000 unit cap Take 1 tablet by mouth daily after supper.        FERROUS FUMARATE (IRON ORAL) Take 325 mg by mouth 2 (two) times a day with meals. TABS        MULTIVITAMIN (MULTIPLE VITAMIN ORAL) Take 1 tablet by mouth daily after supper.        traZODone (DESYREL) 100 MG tablet Take 3 tablets (300 mg total) by mouth at bedtime. 270 tablet 3     venlafaxine (EFFEXOR-XR) 150 MG 24 hr capsule Take 1 capsule (150 mg total) by mouth daily. 90 capsule 0     No current facility-administered medications for this visit.                Phone call duration:  15 minutes    Martine White CMA

## 2021-06-14 ENCOUNTER — AMBULATORY - HEALTHEAST (OUTPATIENT)
Dept: NURSING | Facility: CLINIC | Age: 51
End: 2021-06-14

## 2021-06-14 NOTE — TELEPHONE ENCOUNTER
RN cannot approve Refill Request    RN can NOT refill this medication PCP messaged that patient is overdue for Labs. Last office visit: 10/13/2017 Ronnie Bishop MD Last Physical: 10/28/2019 Last MTM visit: Visit date not found Last visit same specialty: 2/6/2020 Kristin Blackwell MD.  Next visit within 3 mo: Visit date not found  Next physical within 3 mo: Visit date not found      Shobha Simon, [facility] Triage/Med Refill 1/8/2021    Requested Prescriptions   Pending Prescriptions Disp Refills     venlafaxine (EFFEXOR-XR) 150 MG 24 hr capsule [Pharmacy Med Name: VENLAFAXINE 150MG ER CAPSULES] 90 capsule 0     Sig: TAKE 1 CAPSULE(150 MG) BY MOUTH DAILY       Venlafaxine/Desvenlafaxine Refill Protocol Failed - 1/7/2021 10:05 AM        Failed - LFT or AST or ALT in last year     Albumin   Date Value Ref Range Status   10/28/2019 3.8 3.5 - 5.0 g/dL Final     Bilirubin, Total   Date Value Ref Range Status   10/28/2019 0.2 0.0 - 1.0 mg/dL Final     Alkaline Phosphatase   Date Value Ref Range Status   10/28/2019 60 45 - 120 U/L Final     AST   Date Value Ref Range Status   10/28/2019 17 0 - 40 U/L Final     ALT   Date Value Ref Range Status   10/28/2019 20 0 - 45 U/L Final     Protein, Total   Date Value Ref Range Status   10/28/2019 7.0 6.0 - 8.0 g/dL Final                Failed - Fasting lipid cascade in last year     Cholesterol   Date Value Ref Range Status   10/28/2019 210 (H) <=199 mg/dL Final     Triglycerides   Date Value Ref Range Status   10/28/2019 104 <=149 mg/dL Final     HDL Cholesterol   Date Value Ref Range Status   10/28/2019 58 >=50 mg/dL Final     LDL Calculated   Date Value Ref Range Status   10/28/2019 131 (H) <=129 mg/dL Final     Patient Fasting > 8hrs?   Date Value Ref Range Status   10/28/2019 No  Final             Passed - PCP or prescribing provider visit in last year     Last office visit with prescriber/PCP: 10/13/2017 Ronnie Bishop MD [place] same dept: 2/6/2020 [place]  MD Kristin OR same specialty: 2/6/2020 Kristin Blackwell MD  Last physical: 10/28/2019 Last MTM visit: Visit date not found   Next visit within 3 mo: Visit date not found  Next physical within 3 mo: Visit date not found  Prescriber OR PCP: Ronnie Bishop MD  Last diagnosis associated with med order: 1. Depression  - venlafaxine (EFFEXOR-XR) 150 MG 24 hr capsule [Pharmacy Med Name: VENLAFAXINE 150MG ER CAPSULES]; TAKE 1 CAPSULE(150 MG) BY MOUTH DAILY  Dispense: 90 capsule; Refill: 0    If protocol passes may refill for 12 months if within 3 months of last provider visit (or a total of 15 months).             Passed - Blood Pressure in last year     BP Readings from Last 1 Encounters:   02/06/20 112/78

## 2021-06-15 NOTE — PROGRESS NOTES
Assessment and Plan:     Patient has been advised of split billing requirements and indicates understanding: No  1. Medicare annual wellness visit, subsequent  Minnie is single.  She has been on long-term disability.  She scores 5 on mini cog, and has no cognitive deficits noted.  She is sedentary.  She is independent in activities of daily living.  She does have a healthcare directive.    2. Hypercholesterolemia  Fasting lipid cascade will be checked.  She has no known history of coronary artery disease.  - Lipid Cascade    3. Major depressive disorder, recurrent episode, in full remission (H)  She is on Effexor  mg daily with trazodone 300 mg at bedtime.  She feels she is doing well on this regimen    4. Post-traumatic Stress Disorder  She feels she is managing fairly well currently    5. Primary insomnia  Symptoms are under good control with trazodone.  She denies any issues with morning hangover    6. Iron deficiency anemia, unspecified iron deficiency anemia type  She has been on an iron supplement due to a history of iron deficiency anemia.  Ferritin will be checked.  She states she still has menses  - Ferritin  - HM2(CBC w/o Differential)    7. Encounter for therapeutic drug monitoring  Monitoring labs are checked  - Comprehensive Metabolic Panel  - Urinalysis-UC if Indicated    8. Vitamin D deficiency  Vitamin D level will be checked  - Vitamin D, Total (25-Hydroxy)    9. Visit for screening mammogram  Screening mammogram is encouraged  - Mammo Screening Bilateral; Future    10. Screening for colon cancer  Options for colon cancer screening were reviewed including colonoscopy and Cologuard.  She is interested in Colonoscopy  - Ambulatory referral for Colonoscopy    11. Acute recurrent sinusitis, unspecified location  She reports she has issues with recurrent sinusitis and wishes to have a prescription for amoxicillin on hand.  It was discussed that this is only helpful for bacterial infections, and  would not be of benefit for a viral URI.  - amoxicillin (AMOXIL) 500 MG capsule; Take 1 capsule (500 mg total) by mouth 3 (three) times a day for 10 days.  Dispense: 30 capsule; Refill: 0    Plan:  1.  Labs as outlined.  She will be notified of test results    2.  Schedule colonoscopy for colon cancer screening    3.  Schedule mammogram    4.  Last GYN check was 618    5.  Covid vaccine advised.  Shingrix vaccine advised.    6.  Flu shot was encouraged but declined.    7.  She will be notified of test results.  See in 1 year or as needed.    8.   The patient's current medical problems were reviewed.    The following high BMI interventions were performed this visit: encouragement to exercise, weight monitoring and prescribed dietary intake  The following health maintenance schedule was reviewed with the patient and provided in printed form in the after visit summary:   Health Maintenance   Topic Date Due     HEPATITIS C SCREENING  1970     COLORECTAL CANCER SCREENING  12/21/1986     ZOSTER VACCINES (1 of 2) 12/21/2018     MAMMOGRAM  10/28/2020     MEDICARE ANNUAL WELLNESS VISIT  02/02/2022     TD 18+ HE  04/23/2023     PAP SMEAR  06/08/2023     HPV TEST  06/08/2023     ADVANCE CARE PLANNING  10/28/2024     LIPID  02/02/2026     DEPRESSION ACTION PLAN  Completed     HIV SCREENING  Completed     TDAP ADULT ONE TIME DOSE  Completed     Pneumococcal Vaccine: Pediatrics (0 to 5 Years) and At-Risk Patients (6 to 64 Years)  Aged Out     HEPATITIS B VACCINES  Aged Out        Subjective:   Chief Complaint: Minnie Aguilar is an 50 y.o. female here for an Annual Wellness visit.   HPI: 50-year-old woman seen for an annual wellness visit.  She has been on long-term disability due to depressive issues.  She scores 5 on mini cog, and has no cognitive deficits noted.  She is fairly sedentary, but independent in activities of daily living.  She reports she has a healthcare directive    She remains on Effexor XR for depressive  symptoms.  She feels she has done well with this.  She has been on trazodone 300 mg at bedtime she has been sleeping well and denies morning hangover    She notes some issues with bladder control.  She does not feel this is severe enough where she is interested in a urology assessment at this time.    She has been on an iron supplement due to iron deficiency.  She still has menses.    Review of Systems:    Please see above.  The rest of the review of systems are negative for all systems.    Patient Care Team:  Ronnie Bishop MD as PCP - General (Internal Medicine)  Kristin Blackwell MD as Assigned PCP     Patient Active Problem List   Diagnosis     Hypercholesterolemia     Recurrent Major Depression In Full Remission     Vitamin D Deficiency     Post-traumatic Stress Disorder     Anemia     Insomnia     Anxiety disorder     Enlarged uterus     Past Medical History:   Diagnosis Date     Anemia      Anxiety      Depression      Hyperlipidemia      Leiomyoma of uterus      PTSD (post-traumatic stress disorder)      Restless leg syndrome      Vitamin D deficiency       Past Surgical History:   Procedure Laterality Date     EXPLORATORY LAPAROTOMY N/A 7/7/2014    Procedure: POSSIBLE LAPAROTOMY;  Surgeon: Kavin Silveira MD;  Location: [facility] [place];  Service:      MYOMECTOMY  2005     [place] MYOMECTOMY 1-4,W/TOT 250GMS/<,ABD APPRCH      Description: Uterine Myomectomy;  Proc Date: 04/14/2004;     [place] MYOMECTOMY 5/>,TOT>250 GMS,ABD APPRCH N/A 11/13/2014    Procedure: LAPAROTOMY MYOMECTOMY;  Surgeon: Kavin Silveira MD;  Location: [facility] [place];  Service: Gynecology      Family History   Problem Relation Age of Onset     Early death Mother      Hodgkin's lymphoma Mother 18        cause of death     Vision loss Father      Mental illness Maternal Grandmother      Alcohol abuse Maternal Grandfather      Diabetes Maternal Grandfather      Stroke Maternal Grandfather      Cancer Paternal Uncle      Breast cancer Neg  Hx       Social History     Socioeconomic History     Marital status: Single     Spouse name: Not on file     Number of children: Not on file     Years of education: Not on file     Highest education level: Not on file   Occupational History     Not on file   Social Needs     Financial resource strain: Not on file     Food insecurity     Worry: Not on file     Inability: Not on file     Transportation needs     Medical: Not on file     Non-medical: Not on file   Tobacco Use     Smoking status: Never Smoker     Smokeless tobacco: Never Used   Substance and Sexual Activity     Alcohol use: Yes     Comment: occasional      Drug use: No     Sexual activity: Not on file   Lifestyle     Physical activity     Days per week: Not on file     Minutes per session: Not on file     Stress: Not on file   Relationships     Social connections     Talks on phone: Not on file     Gets together: Not on file     Attends Taoism service: Not on file     Active member of club or organization: Not on file     Attends meetings of clubs or organizations: Not on file     Relationship status: Not on file     Intimate partner violence     Fear of current or ex partner: Not on file     Emotionally abused: Not on file     Physically abused: Not on file     Forced sexual activity: Not on file   Other Topics Concern     Not on file   Social History Narrative     Not on file      Current Outpatient Medications   Medication Sig Dispense Refill     ascorbic acid (VITAMIN C) 1000 MG tablet Take 1,000 mg by mouth daily after supper.        biotin 10 mg Tab Take 1 tablet by mouth daily after supper.        cholecalciferol, vitamin D3, (VITAMIN D3) 2,000 unit cap Take 1 tablet by mouth daily after supper.        FERROUS FUMARATE (IRON ORAL) Take 325 mg by mouth 2 (two) times a day with meals. TABS        MULTIVITAMIN (MULTIPLE VITAMIN ORAL) Take 1 tablet by mouth daily after supper.        traZODone (DESYREL) 100 MG tablet Take 3 tablets (300 mg total)  "by mouth at bedtime. 270 tablet 3     venlafaxine (EFFEXOR-XR) 150 MG 24 hr capsule TAKE 1 CAPSULE(150 MG) BY MOUTH DAILY 90 capsule 3     amoxicillin (AMOXIL) 500 MG capsule Take 1 capsule (500 mg total) by mouth 3 (three) times a day for 10 days. 30 capsule 0     No current facility-administered medications for this visit.       Objective:   Vital Signs:   Visit Vitals  /74 (Patient Site: Left Arm, Patient Position: Sitting, Cuff Size: Adult Regular)   Pulse 98   Ht 5' 2\" (1.575 m)   Wt 188 lb 4.8 oz (85.4 kg)   LMP 01/15/2021   BMI 34.44 kg/m           VisionScreening:  No exam data present     PHYSICAL EXAM  /74 (Patient Site: Left Arm, Patient Position: Sitting, Cuff Size: Adult Regular)   Pulse 98   Ht 5' 2\" (1.575 m)   Wt 188 lb 4.8 oz (85.4 kg)   LMP 01/15/2021   BMI 34.44 kg/m      General Appearance:  Alert, cooperative, no distress, appears stated age   Head:  Normocephalic, without obvious abnormality, atraumatic   Eyes:  PERRL, conjunctiva/corneas clear, EOM's intact   Ears:  Normal TM's and external ear canals, both ears   Nose: Nares normal, septum midline,mucosa normal, no drainage    Throat: Lips, mucosa, and tongue normal; teeth and gums normal   Neck: Supple, symmetrical, trachea midline, no adenopathy;  thyroid: not enlarged, symmetric, no tenderness/mass/nodules; no carotid bruit or JVD   Back:   Symmetric, no curvature, ROM normal, no CVA tenderness   Lungs:   Clear to auscultation bilaterally, respirations unlabored   Breasts:  No breast masses, tenderness, asymmetry, or nipple discharge.   Heart:  Regular rate and rhythm, S1 and S2 normal, no murmur, rub, or gallop   Abdomen:   Soft, non-tender, bowel sounds active all four quadrants,  no masses, no organomegaly.  Lower abdominal midline scar.   Genitalia: deferred   Pelvic: Deferred   Extremities: Extremities normal, atraumatic, no cyanosis or edema   Skin: Skin color, texture, turgor normal, no rashes or lesions   Lymph " nodes: Cervical, supraclavicular, and axillary nodes normal   Neurologic: No dysarthria or aphasia.  Cranial nerves, motor and sensory exams intact with symmetric DTRs         Assessment Results 2/2/2021   Activities of Daily Living No help needed   Instrumental Activities of Daily Living No help needed   Get Up and Go Score Less than 12 seconds   Mini Cog Total Score 5   Some recent data might be hidden     A Mini-Cog score of 0-2 suggests the possibility of dementia, score of 3-5 suggests no dementia    Identified Health Risks:     She is at risk for lack of exercise and has been provided with information to increase physical activity for the benefit of her well-being.  The patient was counseled and encouraged to consider modifying their diet and eating habits. She was provided with information on recommended healthy diet options.  The patient was provided with written information regarding signs of hearing loss.  Information on urinary incontinence and treatment options given to patient.  The patient was provided with suggestions to help her develop a healthy emotional lifestyle.   She is at risk for falling and has been provided with information to reduce the risk of falling at home.  Patient's advanced directive was discussed and I am comfortable with the patient's wishes.

## 2021-06-17 NOTE — PATIENT INSTRUCTIONS - HE
Patient Instructions by Greer Gamino CMA at 10/28/2019  1:45 PM     Author: Greer Gamino CMA Service: -- Author Type: Certified Medical Assistant    Filed: 10/28/2019  2:20 PM Encounter Date: 10/28/2019 Status: Addendum    : Ronnie Bishop MD (Physician)    Related Notes: Original Note by Greer Gamino CMA (Certified Medical Assistant) filed at 10/28/2019  1:54 PM         Patient Education   Understanding USDA MyPlate  The USDA (US Department of Agriculture) has guidelines to help you make healthy food choices. These are called MyPlate. MyPlate shows the food groups that make up healthy meals using the image of a place setting. Before you eat, think about the healthiest choices for what to put onto your plate or into your cup or bowl. To learn more about building a healthy plate, visit www.choosemyplate.gov.       The Food Groups    Fruits: Any fruit or 100% fruit juice counts as part of the Fruit Group. Fruits may be fresh, canned, frozen, or dried, and may be whole, cut-up, or pureed. Make half your plate fruits and vegetables.    Vegetables: Any vegetable or 100% vegetable juice counts as a member of the Vegetable Group. Vegetables may be fresh, frozen, canned, or dried. They can be served raw or cooked and may be whole, cut-up, or mashed. Make half your plate fruits and vegetables.     Grains: All foods made from grains are part of the Grains Group. These include wheat, rice, oats, cornmeal, and barley such as bread, pasta, oatmeal, cereal, tortillas, and grits. Grains should be no more than a quarter of your plate. At least half of your grains should be whole grains.    Protein: This group includes meat, poultry, seafood, beans and peas, eggs, processed soy products (like tofu), nuts (including nut butters), and seeds. Make protein choices no more than a quarter of your plate. Meat and poultry choices should be lean or low fat.    Dairy: All fluid milk products and foods made from milk that  contain calcium, like yogurt and cheese are part of the Dairy Group. (Foods that have little calcium, such as cream, butter, and cream cheese, are not part of the group.) Most dairy choices should be low-fat or fat-free.    Oils: These are fats that are liquid at room temperature. They include canola, corn, olive, soybean, and sunflower oil. Foods that are mainly oil include mayonnaise, certain salad dressings, and soft margarines. You should have only 5 to 7 teaspoons of oils a day. You probably already get this much from the food you eat.  Use X2IMPACTer to Help Build Your Meals  The SuperTracker can help you plan and track your meals and activity. You can look up individual foods to see or compare their nutritional value. You can get guidelines for what and how much you should eat. You can compare your food choices. And you can assess personal physical activities and see ways you can improve. Go to www.BeVocal.gov/Teal Orbitcker/.    3392-4578 [employer]. [address] [place] [address]. All rights reserved. This information is not intended as a substitute for professional medical care. Always follow your healthcare professional's instructions.           Patient Education   Signs of Hearing Loss  Hearing loss is a problem shared by many people. In fact, it is one of the most common health conditions, particularly as people age. Most people over age 65 have some hearing loss, and by age 80, almost everyone does. Because hearing loss usually occurs slowly over the years, you may not realize your hearing ability has gotten worse.       Have your hearing checked  Contact your [facility] care provider if you:    Have to strain to hear normal conversation.    Have to watch other peoples faces very carefully to follow what theyre saying.    Need to ask people to repeat what theyve said.    Often misunderstand what people are saying.    Turn the volume of the television or radio up so high that  others complain.    Feel that people are mumbling when theyre talking to you.    Find that the effort to hear leaves you feeling tired and irritated.    Notice, when using the phone, that you hear better with 1 ear than the other.    7935-9197 [employer]. [address] [place] [address]. All rights reserved. This information is not intended as a substitute for professional medical care. Always follow your healthcare professional's instructions.         Patient Education   Urinary Incontinence, Female (Adult)  Urinary incontinence means loss of control of the bladder. This problem affects many women, especially as they get older. If you have incontinence, you may be embarrassed to ask for help. But know that this problem can be treated.  Types of Incontinence  There are different types of incontinence. Two of the main types are described here. You can have more than one type.    Stress incontinence. With this type, urine leaks when pressure (stress) is put on the bladder. This may happen when you cough, sneeze, or laugh. Stress incontinence most often occurs because the pelvic floor muscles that support the bladder and urethra are weak. This can happen after pregnancy and vaginal childbirth or a hysterectomy. It can also be due to excess body weight or hormone changes.    Urge incontinence (also called overactive bladder). With this type, a sudden urge to urinate is felt often. This may happen even though there may not be much urine in the bladder. The need to urinate often during the night is common. Urge incontinence most often occurs because of bladder spasms. This may be due to bladder irritation or infection. Damage to bladder nerves or pelvic muscles, constipation, and certain medicines can also lead to urge incontinence.  Treatment of urinary incontinence depends on the cause. Further evaluation is needed to find the type you have. This will likely include an exam and certain tests.  Based on the results, you and your healthcare provider can then plan treatment. Until a diagnosis is made, the home care tips below can help relieve symptoms.  Home care    Do pelvic floor muscle exercises, if they are prescribed. The pelvic floor muscles help support the bladder and urethra. Many women find that their symptoms improve when doing special exercises that strengthen these muscles. To do the exercises contract the muscles you would use to stop your stream of urine, but do this when youre not urinating. Hold for 10 seconds, then relax. Repeat 10 to 20 times in a row, at least 3 times a day. Your provider may give you other instructions for how to do the exercises and how often.    Keep a bladder diary. This helps track how often and how much you urinate over a set period of time. Bring this diary with you to your next visit with the provider. The information can help your provider learn more about your bladder problem.    Lose weight, if advised to by your provider. Excess weight puts pressure on the bladder. Your provider can help you create a weight-loss plan thats right for you. This may include exercising more and making certain diet changes.    Don't consume foods and drinks that may irritate the bladder. These can include alcohol and caffeinated drinks.    Quit smoking. Smoking and other tobacco use can lead to chronic cough that strains the pelvic floor muscles. Smoking may also damage the bladder and urethra. Talk with your provider about treatments or methods you can use to quit smoking.    If drinking large amounts of fluid causes you to have symptoms, you may be advised to limit your fluid intake. You may also be advised to drink most of your fluids during the day and to limit fluids at night.    If youre worried about urine leakage or accidents, you may wear absorbent pads to catch urine. Change the pads often. This helps reduce discomfort. It may also reduce the risk of skin or bladder  infections.  Follow-up care  Follow up with your healthcare provider, or as directed. It may take some to find the right treatment for your problem. Your treatment plan may include special therapies or medicines. Certain procedures or surgery may also be options. Be sure to discuss any questions you have with your provider.  When to seek medical advice  Call the healthcare provider right away if any of these occur:    Fever of 100.4 F (38 C) or higher, or as directed by your provider    Bladder pain or fullness    Abdominal swelling    Nausea or vomiting    Back pain    Weakness, dizziness or fainting  Date Last Reviewed: 10/1/2017    3639-1965 [employer]. [address] [place] [address]. All rights reserved. This information is not intended as a substitute for professional medical care. Always follow your healthcare professional's instructions.     Patient Education   Depression and Suicide in Older Adults  Nearly 2 million older Americans have some type of depression. Sadly, some of them even take their own lives. Yet depression among older adults is often ignored. Learn the warning signs. You may help spare a loved one needless pain. You may also save a life.       What Is Depression?  Depression is a mood disorder that affects the way you think and feel. The most common symptom is a feeling of deep sadness. People who are depressed also may seem tired and listless. And nothing seems to give them pleasure. Its normal to grieve or be sad sometimes. But sadness lessens or passes with time. Depression rarely goes away or improves on its own. Other symptoms of depression are:    Sleeping more or less than normal    Eating more or less than normal    Having headaches, stomachaches, or other pains that dont go away    Feeling nervous, empty, or worthless    Crying a great deal    Thinking or talking about suicide or death    Feeling confused or forgetful  What Causes It?  The causes of  depression arent fully known. Certain chemicals in the brain play a role. Depression does run in families. And life stresses can also trigger depression in some people. That may be the case with older adults. They often face great burdens, such as the death of friends or a spouse. They may have failing health. And they are more likely to be alone, lonely, or poor.  How You Can Help  Often, depressed people may not want to ask for help. When they do, they may be ignored. Or, they may receive the wrong treatment. You can help by showing parents and older friends love and support. If they seem depressed, help them find the right treatment. Talk to your doctor. Or contact a local mental health center, social service agency, or hospital. With modern treatment, no one has to suffer from depression.  Resources:    National [place] of Mental Health  961.832.4416  www.nimh.nih.gov    National [place] on Mental Illness  898.343.6490  www.douglas.org    [facility]  819.845.1752  www.[facility].org    National Suicide Hotline  973.332.2183 (800-SUICIDE)      1064-7106 [employer]. [address] [place] [address]. All rights reserved. This information is not intended as a substitute for professional medical care. Always follow your healthcare professional's instructions.           Advance Directive  Patients advance directive was discussed and I am comfortable with the patients wishes.  Patient Education   Personalized Prevention Plan  You are due for the preventive services outlined below.  Your care team is available to assist you in scheduling these services.  If you have already completed any of these items, please share that information with your care team to update in your medical record.  Health Maintenance   Topic Date Due   ? HIV SCREENING  12/21/1983   ? DEPRESSION FOLLOW UP  11/25/2017   ? MAMMOGRAM  05/25/2018   ? COLONOSCOPY  12/21/2018   ? MEDICARE ANNUAL WELLNESS VISIT  06/08/2019   ?  ZOSTER VACCINES (1 of 2) 12/21/2018   ? HPV TEST  05/25/2021   ? TD 18+ HE  04/23/2023   ? PAP SMEAR  06/08/2023   ? ADVANCE CARE PLANNING  09/19/2024   ? TDAP ADULT ONE TIME DOSE  Completed      1.  Mammogram as planned    2.  Advise colonoscopy.  GI office will call    3.  I will notify you of lab results.    4.  See in one year or as needed

## 2021-06-18 NOTE — PROGRESS NOTES
Assessment and Plan:   1.  Health maintenance: Minnie states she is on disability.  He does some work as a preacher.  Overall, she reports that she is feeling well.  Health maintenance and screening issues were reviewed.  She does have a healthcare directive.    2.  History of abnormal Pap smear;  ASCUS was noted on herbs Pap smear from 2016.  She reports she is not sexually active.  She denies vaginal discharge or pelvic pain.  Menses have been regular.  Pap smear will be rechecked today.    3.  History of depression and posttraumatic stress disorder:  She feels that she sleeps well on trazodone without morning hangover.  The depressive symptoms have been under good control with Effexor.    Plan:    1.  The patient's current medical problems were reviewed.  2.  Pap smear was done.  She will be notified of test results.  3.  Lab as outlined.  She will be notified of test results  4.  Continue current medications.  5.  See in 1 year or as needed  The following health maintenance schedule was reviewed with the patient and provided in printed form in the after visit summary:   Health Maintenance   Topic Date Due     DEPRESSION FOLLOW UP  11/25/2017     MAMMOGRAM  05/25/2018     ADVANCE DIRECTIVES DISCUSSED WITH PATIENT  11/19/2019     PAP SMEAR  05/25/2021     TD 18+ HE  04/23/2023     TDAP ADULT ONE TIME DOSE  Completed        Subjective:   Chief Complaint: Minnie Aguilar is an 47 y.o. female here for an Annual Wellness visit.     HPI:     Insomnia: She takes 200 mg trazodone at bedtime for insomnia. She denies any morning grogginess or hangover.    Anxiety/Depression: She continues on 150 mg Effexor daily. Believes she is doing well on her current dose.    Health Maintenance: She is due for mammogram; last 5/25/17, normal, repeat yearly. Due for pap; last 5/25/16, ASCUS, negative HPV, repeat in 1 year. Up to date on tetanus shot.    Review of Systems:  She continues to take a daily iron and vitamin D supplements.  She walks frequently. She believes she drinks too much caffeine. The rest of the review of systems are negative for all systems.    PFSH:  Family: Mother  of Hodgkin's lymphoma at age 18. Only child. Paternal uncle with cancer. MGF with diabetes.  Social: Not smoker. 1-2 alcoholic drinks a week.     Patient Care Team:  Ronnie Bishop MD as PCP - General (Internal Medicine)     Patient Active Problem List   Diagnosis     Hyperlipemia     Recurrent Major Depression In Full Remission     Vitamin D Deficiency     Post-traumatic Stress Disorder     Anemia     Insomnia     Anxiety disorder     Enlarged uterus     Past Medical History:   Diagnosis Date     Anemia      Anxiety      Depression      Hyperlipidemia      Leiomyoma of uterus      PTSD (post-traumatic stress disorder)      Restless leg syndrome      Vitamin D deficiency       Past Surgical History:   Procedure Laterality Date     EXPLORATORY LAPAROTOMY N/A 2014    Procedure: POSSIBLE LAPAROTOMY;  Surgeon: Kavin Silveira MD;  Location: [facility];  Service:      MYOMECTOMY       WI MYOMECTOMY 1-4,W/TOT 250GMS/<,ABD APPH      Description: Uterine Myomectomy;  Proc Date: 2004;     WI MYOMECTOMY 5/>,TOT>250 GMS,ABD APPRCH N/A 2014    Procedure: LAPAROTOMY MYOMECTOMY;  Surgeon: Kavin Silveira MD;  Location: [facility];  Service: Gynecology      Family History   Problem Relation Age of Onset     Early death Mother      Hodgkin's lymphoma Mother 18     cause of death     Vision loss Father      Mental illness Maternal Grandmother      Alcohol abuse Maternal Grandfather      Diabetes Maternal Grandfather      Stroke Maternal Grandfather      Cancer Paternal Uncle      Breast cancer Neg Hx       Social History     Social History     Marital status: Single     Spouse name: N/A     Number of children: N/A     Years of education: N/A     Occupational History     Not on file.     Social History Main Topics     Smoking status:  "Never Smoker     Smokeless tobacco: Never Used     Alcohol use Yes      Comment: occasional      Drug use: No     Sexual activity: Not on file     Other Topics Concern     Not on file     Social History Narrative      Current Outpatient Prescriptions   Medication Sig Dispense Refill     amoxicillin (AMOXIL) 500 MG capsule TAKE ONE CAPSULE BY MOUTH THREE TIMES A DAY FOR  DAYS 30 capsule 0     ascorbic acid (VITAMIN C) 1000 MG tablet Take 1,000 mg by mouth daily after supper.        biotin 10 mg Tab Take 1 tablet by mouth daily after supper.        cholecalciferol, vitamin D3, (VITAMIN D3) 2,000 unit cap Take 1 tablet by mouth daily after supper.        DOCOSAHEXANOIC ACID/EPA (FISH OIL ORAL) Take 1,200 mg by mouth daily after supper.       FERROUS FUMARATE (IRON ORAL) Take 325 mg by mouth 2 (two) times a day with meals. TABS        hydrocortisone 1 % cream Apply 1 application topically daily as needed (for rash on arm). OTC med       MULTIVITAMIN (MULTIPLE VITAMIN ORAL) Take 1 tablet by mouth daily after supper.        traZODone (DESYREL) 100 MG tablet TAKE TWO TABLETS BY MOUTH AT BEDTIME 60 tablet 6     venlafaxine (EFFEXOR-XR) 150 MG 24 hr capsule TAKE ONE CAPSULE BY MOUTH EVERY DAY 90 capsule 3     No current facility-administered medications for this visit.       Objective:   Vital Signs:   Visit Vitals     /80     Pulse 84     Temp 98.4  F (36.9  C) (Temporal)     Ht 5' 1.5\" (1.562 m)     Wt 155 lb 8 oz (70.5 kg)     SpO2 98%     BMI 28.91 kg/m2        VisionScreening:  No exam data present     PHYSICAL EXAM  Constitutional:   Reveals an alert talkative female.  Vitals: per nursing notes.  HEENT:  Atraumatic. Ears:  External canals, TMs clear.    Eyes:  EOMs full, PERRL.  Oropharynx:   Mouth and throat clear, no thrush or exudate.  Neck:  Supple, no carotid bruits or adenopathy.  Back:  No spine or CVA pain.  Thorax:  No bony deformities.  Lungs: Clear to A&P without rales or wheezes.  Respiratory effort " normal.  Cardiac:   Regular rate and rhythm, normal S1, S2, no murmur.  Breasts:   No masses, no axillary adenopathy.  Abdomen:  Soft, active bowel sounds without bruits, mass, or tenderness. Femoral pulses intact. Low abdominal midline scar.  :  Pelvic:  Normal female external genitalia.  Patchy vitiligo around the vulva. Normal vaginal mucosa.  Cervix normal to inspection, no abnormalities. Pap smear obtained today.             Rectal:  No masses.  Extremities:  Well-developed. No significant joint deformity.  No peripheral edema, pulses in the feet intact.    Skin:  No skin lesions to suggest malignancy. Large scar on the medial left shin. Deep tendon reflexes intact.   Neuro:  Alert and oriented. Cranial nerves, motor, sensory exams are intact.  No gross focal deficits.    Assessment Results 6/8/2018   Activities of Daily Living No help needed   Instrumental Activities of Daily Living No help needed   Get Up and Go Score Less than 12 seconds   Mini Cog Total Score 5   Some recent data might be hidden     A Mini-Cog score of 0-2 suggests the possibility of dementia, score of 3-5 suggests no dementia    Identified Health Risks:     The patient was provided with written information regarding signs of hearing loss.  Patient's advanced directive was discussed and I am comfortable with the patient's wishes.      ADDITIONAL HISTORY SUMMARIZED (2): Reviewed 5/25/17 physical note regarding depression, anemia, vitamin D deficiency.  DECISION TO OBTAIN EXTRA INFORMATION (1): None.   RADIOLOGY TESTS (1): None.  LABS (1): Labs were ordered today. Reviewed pap from 2016.  MEDICINE TESTS (1): None.  INDEPENDENT REVIEW (2 each): None.     The visit lasted a total of 31 minutes face to face with the patient. Over 50% of the time was spent counseling and educating the patient about health maintenance.    IApolonia, am scribing for and in the presence of, Dr. Bishop.    I, Dr. Ronnie Bishop, personally performed the  services described in this documentation, as scribed by Apolonia Williamson in my presence, and it is both accurate and complete.    Total data points: 3

## 2021-06-19 NOTE — LETTER
Letter by Maria Teresa Mcnally RN at      Author: Maria Teresa Mcnally RN Service: -- Author Type: --    Filed:  Encounter Date: 6/10/2019 Status: (Other)       [facility]  [address] [address]        Minnie Aguilar  [address] [place] [address]    Dear Minnie    We have reviewed the Health Care Directive dated 5-30-19 which you presented for addition to   your medical record. Unfortunately, our review indicates the document is not legally valid for   the following reason(s): The document is missing page(s) 1 and only one witness dated the document.  In order to create a legal document you will need to send us your entire document and have the 2nd witness dated the document.    We greatly value the opportunity to assist you in documenting your choices and to honor your   wishes. We apologize for any inconvenience. We have several options to assist you in updating   your document so it meets legal requirements.       Health Care Directives and Advance Care Planning resources can be viewed and printed   for free at our web site:[email or web address].       COPIES of completed Health Care Directives can be brought or mailed to any of our   locations, including the address listed below. You can also email a copy to [email or web address] .       For additional assistance or questions you can email us at [email or web address] or call [phone]      Sincerely,     [facility]  [address] [address]   Email us: [email or web address] Call us: [phone]  Visit at: [email or web address]

## 2021-06-19 NOTE — LETTER
Letter by Maria Teresa Mcnally RN at      Author: Maria Teresa Mcnally, RN Service: -- Author Type: --    Filed:  Encounter Date: 5/10/2019 Status: (Other)       [facility]  [facility] [address]        Minnie Aguilar  [address] [place] [address]    Dear Minnie      5/10/2019    We were recently notified that you requested removal of a person from your emergency contact list. This person is named as a health care agent in your health care directive dated 6-8-14. Because this is a legal document, we cannot remove this person as a contact.    In order to remove someone named as a health care agent you will need to either provide us with a more recently dated health care directive or create a new one.   We have several options to assist you in creating a new document:   Health Care Directives and Advance Care Planning resources can be viewed and printed for free at our website: [email or web address]    Free group classes on Advance Care Planning and completing a Health Care Directive are available at multiple locations and times. These classes are led by trained staff who will provide information and guide you through a Health Care Directive. They can also review, notarize and add your Health Care Directive to your medical record. [place] for a class at [email or web address] or by calling [employer] at   [phone] or toll free [phone].    COPIES of completed Health Care Directives can be brought or mailed to any of our locations, including the address listed below. You can also email a copy to [email or web address]       Sincerely,    [facility]  [facility]  [address] [place] [address] [address]  [facility] [address]  Email us: [email or web address] Call us: [phone]  Visit at: [email or web address]

## 2021-06-19 NOTE — LETTER
Letter by Ronnie Bishop MD at      Author: Ronnie Bishop MD Service: -- Author Type: --    Filed:  Encounter Date: 10/29/2019 Status: Signed         Minnie Aguilar  [address] [address]             October 29, 2019         Dear Ms. Aguilar,    Below are the results from your recent visit:    Resulted Orders   Lipid Cascade   Result Value Ref Range    Cholesterol 210 (H) <=199 mg/dL    Triglycerides 104 <=149 mg/dL    HDL Cholesterol 58 >=50 mg/dL    LDL Calculated 131 (H) <=129 mg/dL    Patient Fasting > 8hrs? No    Comprehensive Metabolic Panel   Result Value Ref Range    Sodium 141 136 - 145 mmol/L    Potassium 4.1 3.5 - 5.0 mmol/L    Chloride 107 98 - 107 mmol/L    CO2 27 22 - 31 mmol/L    Anion Gap, Calculation 7 5 - 18 mmol/L    Glucose 87 70 - 125 mg/dL    BUN 16 8 - 22 mg/dL    Creatinine 0.79 0.60 - 1.10 mg/dL    GFR MDRD Af Amer >60 >60 mL/min/1.73m2    GFR MDRD Non Af Amer >60 >60 mL/min/1.73m2    Bilirubin, Total 0.2 0.0 - 1.0 mg/dL    Calcium 8.8 8.5 - 10.5 mg/dL    Protein, Total 7.0 6.0 - 8.0 g/dL    Albumin 3.8 3.5 - 5.0 g/dL    Alkaline Phosphatase 60 45 - 120 U/L    AST 17 0 - 40 U/L    ALT 20 0 - 45 U/L    Narrative    Fasting Glucose reference range is 70-99 mg/dL per  American Diabetes Association (ADA) guidelines.   HM2(CBC w/o Differential)   Result Value Ref Range    WBC 3.3 (L) 4.0 - 11.0 thou/uL    RBC 4.16 3.80 - 5.40 mill/uL    Hemoglobin 12.3 12.0 - 16.0 g/dL    Hematocrit 37.4 35.0 - 47.0 %    MCV 90 80 - 100 fL    MCH 29.6 27.0 - 34.0 pg    MCHC 32.9 32.0 - 36.0 g/dL    RDW 10.8 (L) 11.0 - 14.5 %    Platelets 319 140 - 440 thou/uL    MPV 7.0 7.0 - 10.0 fL   Urinalysis-UC if Indicated   Result Value Ref Range    Color, UA Yellow Colorless, Yellow, Straw, Light Yellow    Clarity, UA Clear Clear    Glucose, UA Negative Negative    Bilirubin, UA Negative Negative    Ketones, UA Negative Negative    Specific Gravity, UA >=1.030 1.005 - 1.030    Blood, UA Trace (!)  Negative    pH, UA 5.5 5.0 - 8.0    Protein, UA Negative Negative mg/dL    Urobilinogen, UA 0.2 E.U./dL 0.2 E.U./dL, 1.0 E.U./dL    Nitrite, UA Negative Negative    Leukocytes, UA Negative Negative    Bacteria, UA None Seen None Seen hpf    RBC, UA None Seen None Seen, 0-2 hpf    WBC, UA None Seen None Seen, 0-5 hpf    Squam Epithel, UA None Seen None Seen, 0-5 lpf    Narrative    UC not indicated   HIV Antigen/Antibody Screening Cascade   Result Value Ref Range    HIV Antigen / Antibody Negative Negative    Narrative    Method is Abbott HIV Ag/Ab for the detection of HIV p24 antigen, HIV-1 antibodies and HIV-2 antibodies.   Ferritin   Result Value Ref Range    Ferritin 126 10 - 130 ng/mL       Minnie: Your total cholesterol is mildly elevated at 210 with an LDL fraction of 131.  Dietary management is appropriate.  The ferritin, (a measure of iron stores), is in the high normal range at 126.  You may discontinue your iron supplement.  The white blood count of 3300 should be regarded as normal.  Other labs including your hemoglobin, potassium, blood sugar, liver and kidney tests are normal.  It was nice to see you.  The screening test for HIV returned negative as expected.    Please call with questions or contact us using [employer].    Sincerely,        Electronically signed by Ronnie Bishop MD

## 2021-06-19 NOTE — LETTER
Letter by Zaria Pro RN at      Author: Zaria Pro RN Service: -- Author Type: --    Filed:  Encounter Date: 9/19/2019 Status: (Other)       [facility]  [facility]  [address] [place] [address] [address] [place] [address]      Minnie Aguilar  [address] [address]      9/19/2019    Dear Ms Aguilar,    We were recently notified that you requested removal of a person from your emergency contact list. This person is named as a health care agent in your health care directive dated 6/8/14. Because this is a legal document, we cannot remove this person as a contact.    In order to remove someone named as a health care agent you will need to either provide us with a more recently dated health care directive or create a new one.   We have several options to assist you in creating a new document:   Health Care Directives and Advance Care Planning resources can be viewed and printed for free at our website: [email or web address]    Free group classes on Advance Care Planning and completing a Health Care Directive are available at multiple locations and times. These classes are led by trained staff who will provide information and guide you through a Health Care Directive. They can also review, notarize and add your Health Care Directive to your medical record. [place] for a class at [email or web address] or by calling [employer] at   [phone] or toll free [phone].    COPIES of completed Health Care Directives can be brought or mailed to any of our locations, including the address listed below. You can also email a copy to [email or web address]       Sincerely,    [facility]aughter [facility]  [facility]  [address] [place] [address]  Email us: [email or web address] Call us: [phone]  Visit at: [email or web address]

## 2021-06-21 NOTE — LETTER
Letter by Ronnie Bishop MD at      Author: Ronnie Bishop MD Service: -- Author Type: --    Filed:  Encounter Date: 2/4/2021 Status: (Other)         Minnie Aguilar  [address] [address]             February 4, 2021         Dear Ms. Aguilar,    Below are the results from your recent visit:    Resulted Orders   Ferritin   Result Value Ref Range    Ferritin 121 10 - 130 ng/mL   Lipid Cascade   Result Value Ref Range    Cholesterol 231 (H) <=199 mg/dL    Triglycerides 160 (H) <=149 mg/dL    HDL Cholesterol 51 >=50 mg/dL    LDL Calculated 148 (H) <=129 mg/dL    Patient Fasting > 8hrs? No    Vitamin D, Total (25-Hydroxy)   Result Value Ref Range    Vitamin D, Total (25-Hydroxy) 29.4 (L) 30.0 - 80.0 ng/mL    Narrative    Deficiency <10.0 ng/mL  Insufficiency 10.0-29.9 ng/mL  Sufficiency 30.0-80.0 ng/mL  Toxicity (possible) >100.0 ng/mL   Comprehensive Metabolic Panel   Result Value Ref Range    Sodium 140 136 - 145 mmol/L    Potassium 3.9 3.5 - 5.0 mmol/L    Chloride 107 98 - 107 mmol/L    CO2 24 22 - 31 mmol/L    Anion Gap, Calculation 9 5 - 18 mmol/L    Glucose 81 70 - 125 mg/dL    BUN 7 (L) 8 - 22 mg/dL    Creatinine 0.82 0.60 - 1.10 mg/dL    GFR MDRD Af Amer >60 >60 mL/min/1.73m2    GFR MDRD Non Af Amer >60 >60 mL/min/1.73m2    Bilirubin, Total 0.2 0.0 - 1.0 mg/dL    Calcium 9.0 8.5 - 10.5 mg/dL    Protein, Total 7.8 6.0 - 8.0 g/dL    Albumin 4.1 3.5 - 5.0 g/dL    Alkaline Phosphatase 76 45 - 120 U/L    AST 26 0 - 40 U/L    ALT 37 0 - 45 U/L    Narrative    Fasting Glucose reference range is 70-99 mg/dL per  American Diabetes Association (ADA) guidelines.   Urinalysis-UC if Indicated   Result Value Ref Range    Color, UA Yellow Colorless, Yellow, Straw, Light Yellow    Clarity, UA Clear Clear    Glucose, UA Negative Negative    Bilirubin, UA Negative Negative    Ketones, UA Negative Negative    Specific Gravity, UA >=1.030 1.005 - 1.030    Blood, UA Negative Negative    pH, UA 5.5 5.0 - 8.0     Protein, UA Negative Negative mg/dL    Urobilinogen, UA 0.2 E.U./dL 0.2 E.U./dL, 1.0 E.U./dL    Nitrite, UA Negative Negative    Leukocytes, UA Negative Negative    Bacteria, UA Few (!) None Seen hpf    RBC, UA None Seen None Seen, 0-2 hpf    WBC, UA 0-5 None Seen, 0-5 hpf    Squam Epithel, UA 5-10 (!) None Seen, 0-5 lpf    Mucus, UA Few (!) None Seen lpf    Narrative    UC not indicated   HM2(CBC w/o Differential)   Result Value Ref Range    WBC 3.4 (L) 4.0 - 11.0 thou/uL    RBC 4.50 3.80 - 5.40 mill/uL    Hemoglobin 12.8 12.0 - 16.0 g/dL    Hematocrit 41.1 35.0 - 47.0 %    MCV 91 80 - 100 fL    MCH 28.4 27.0 - 34.0 pg    MCHC 31.1 (L) 32.0 - 36.0 g/dL    RDW 12.4 11.0 - 14.5 %    Platelets 337 140 - 440 thou/uL    MPV 9.0 7.0 - 10.0 fL       Minnie: Your vitamin D level is slightly low at 29.4.  I would advise increasing the supplement by 1000 IU daily.  The ferritin, (a measure of iron stores), is in the upper half of normal at 121.  I feel that you could stop your iron supplement for now.  The total cholesterol is moderately elevated to 231 with an LDL fraction of 148.  Triglycerides are mildly elevated for fasting at 160.  Work on diet.  Other labs including your potassium, blood sugar, hemoglobin, liver and kidney tests are normal.  It was nice to see you.    Please call with questions or contact us using [employer].    Sincerely,        Electronically signed by Ronnie Bishop MD

## 2021-07-03 NOTE — ADDENDUM NOTE
Addendum Note by Kaden Singer MA at 11/24/2017  8:20 AM     Author: Kaden Singer MA Service: -- Author Type: Medical Assistant    Filed: 11/24/2017  8:20 AM Encounter Date: 11/22/2017 Status: Signed    : Kaden Singer MA (Medical Assistant)    Addended by: KADEN SINGER on: 11/24/2017 08:20 AM        Modules accepted: Orders

## 2021-07-05 ENCOUNTER — AMBULATORY - HEALTHEAST (OUTPATIENT)
Dept: NURSING | Facility: CLINIC | Age: 51
End: 2021-07-05

## 2021-08-06 NOTE — PATIENT INSTRUCTIONS - HE
Patient Instructions by Ronnie Bishop MD at 2/2/2021 12:55 PM     Author: Ronnie Bishop MD Service: -- Author Type: Physician    Filed: 2/4/2021 12:35 PM Encounter Date: 2/2/2021 Status: Addendum    : Ronnie Bishop MD (Physician)    Related Notes: Original Note by Ronnie Bishop MD (Physician) filed at 2/2/2021  1:25 PM       1.  I will notify you of test results    2.  Schedule colonoscopy    3.  Schedule mammogram    4.  Shingrix vaccine advised.  Check insurance    5.  Covid vaccine when available    6.  See in one year or as needed.    7.  Amoxicillin if needed for sinus infection  Patient Education     Exercise for a Healthier Heart  You may wonder how you can improve the health of your heart. If youre thinking about exercise, youre on the right track. You dont need to become an athlete, but you do need a certain amount of brisk exercise to help strengthen your heart. If you have been diagnosed with a heart condition, your doctor may recommend exercise to help stabilize your condition. To help make exercise a habit, choose safe, fun activities.       Be sure to check with your health care provider before starting an exercise program.    Why exercise?  Exercising regularly offers many healthy rewards. It can help you do all of the following:    Improve your blood cholesterol levels to help prevent further heart trouble    Lower your blood pressure to help prevent a stroke or heart attack    Control diabetes, or reduce your risk of getting this disease    Improve your heart and lung function    Reach and maintain a healthy weight    Make your muscles stronger and more limber so you can stay active    Prevent falls and fractures by slowing the loss of bone mass (osteoporosis)    Manage stress better  Exercise tips  Ease into your routine. Set small goals. Then build on them.  Exercise on most days. Aim for a total of 150 or more minutes of moderate to  vigorous intensity activity each week.  Consider 40 minutes, 3 to 4 times a week. For best results, activity should last for 40 minutes on average. It is OK to work up to the 40 minute period over time. Examples of moderate-intensity activity is walking one mile in 15 minutes or 30 to 45 minutes of yard work.  Step up your daily activity level. Along with your exercise program, try being more active throughout the day. Walk instead of drive. Do more household tasks or yard work.  Choose one or more activities you enjoy. Walking is one of the easiest things you can do. You can also try swimming, riding a bike, or taking an exercise class.  Stop exercising and call your doctor if you:    Have chest pain or feel dizzy or lightheaded    Feel burning, tightness, pressure, or heaviness in your chest, neck, shoulders, back, or arms    Have unusual shortness of breath    Have increased joint or muscle pain    Have palpitations or an irregular heartbeat      9793-0890 [employer]. [address] [place] [address]. All rights reserved. This information is not intended as a substitute for professional medical care. Always follow your healthcare professional's instructions.         Patient Education   Understanding USDA MyPlate  The USDA (US Department of Agriculture) has guidelines to help you make healthy food choices. These are called MyPlate. MyPlate shows the food groups that make up healthy meals using the image of a place setting. Before you eat, think about the healthiest choices for what to put onto your plate or into your cup or bowl. To learn more about building a healthy plate, visit www.choosemyplate.gov.       The Food Groups    Fruits: Any fruit or 100% fruit juice counts as part of the Fruit Group. Fruits may be fresh, canned, frozen, or dried, and may be whole, cut-up, or pureed. Make half your plate fruits and vegetables.    Vegetables: Any vegetable or 100% vegetable juice counts as a member of the Vegetable Group. Vegetables  may be fresh, frozen, canned, or dried. They can be served raw or cooked and may be whole, cut-up, or mashed. Make half your plate fruits and vegetables.     Grains: All foods made from grains are part of the Grains Group. These include wheat, rice, oats, cornmeal, and barley such as bread, pasta, oatmeal, cereal, tortillas, and grits. Grains should be no more than a quarter of your plate. At least half of your grains should be whole grains.    Protein: This group includes meat, poultry, seafood, beans and peas, eggs, processed soy products (like tofu), nuts (including nut butters), and seeds. Make protein choices no more than a quarter of your plate. Meat and poultry choices should be lean or low fat.    Dairy: All fluid milk products and foods made from milk that contain calcium, like yogurt and cheese are part of the Dairy Group. (Foods that have little calcium, such as cream, butter, and cream cheese, are not part of the group.) Most dairy choices should be low-fat or fat-free.    Oils: These are fats that are liquid at room temperature. They include canola, corn, olive, soybean, and sunflower oil. Foods that are mainly oil include mayonnaise, certain salad dressings, and soft margarines. You should have only 5 to 7 teaspoons of oils a day. You probably already get this much from the food you eat.  Use [employer] to Help Build Your Meals  The SuperTracker can help you plan and track your meals and activity. You can look up individual foods to see or compare their nutritional value. You can get guidelines for what and how much you should eat. You can compare your food choices. And you can assess personal physical activities and see ways you can improve. Go to www.FoundHealth.complate.gov/supertracker/.    6439-5251 The [employer]. [address] [place] [address], [place] [address]. All rights reserved. This information is not intended as a substitute for professional medical care. Always follow your healthcare  professional's instructions.           Patient Education   Signs of Hearing Loss  Hearing loss is a problem shared by many people. In fact, it is one of the most common health conditions, particularly as people age. Most people over age 65 have some hearing loss, and by age 80, almost everyone does. Because hearing loss usually occurs slowly over the years, you may not realize your hearing ability has gotten worse.       Have your hearing checked  Contact your [facility] care provider if you:    Have to strain to hear normal conversation.    Have to watch other peoples faces very carefully to follow what theyre saying.    Need to ask people to repeat what theyve said.    Often misunderstand what people are saying.    Turn the volume of the television or radio up so high that others complain.    Feel that people are mumbling when theyre talking to you.    Find that the effort to hear leaves you feeling tired and irritated.    Notice, when using the phone, that you hear better with 1 ear than the other.    4966-8111 [employer]. [address] [place] [address]. All rights reserved. This information is not intended as a substitute for professional medical care. Always follow your healthcare professional's instructions.         Patient Education   Urinary Incontinence, Female (Adult)  Urinary incontinence means loss of control of the bladder. This problem affects many women, especially as they get older. If you have incontinence, you may be embarrassed to ask for help. But know that this problem can be treated.  Types of Incontinence  There are different types of incontinence. Two of the main types are described here. You can have more than one type.    Stress incontinence. With this type, urine leaks when pressure (stress) is put on the bladder. This may happen when you cough, sneeze, or laugh. Stress incontinence most often occurs because the pelvic floor muscles that support the bladder and urethra  are weak. This can happen after pregnancy and vaginal childbirth or a hysterectomy. It can also be due to excess body weight or hormone changes.    Urge incontinence (also called overactive bladder). With this type, a sudden urge to urinate is felt often. This may happen even though there may not be much urine in the bladder. The need to urinate often during the night is common. Urge incontinence most often occurs because of bladder spasms. This may be due to bladder irritation or infection. Damage to bladder nerves or pelvic muscles, constipation, and certain medicines can also lead to urge incontinence.  Treatment of urinary incontinence depends on the cause. Further evaluation is needed to find the type you have. This will likely include an exam and certain tests. Based on the results, you and your healthcare provider can then plan treatment. Until a diagnosis is made, the home care tips below can help relieve symptoms.  Home care    Do pelvic floor muscle exercises, if they are prescribed. The pelvic floor muscles help support the bladder and urethra. Many women find that their symptoms improve when doing special exercises that strengthen these muscles. To do the exercises contract the muscles you would use to stop your stream of urine, but do this when youre not urinating. Hold for 10 seconds, then relax. Repeat 10 to 20 times in a row, at least 3 times a day. Your provider may give you other instructions for how to do the exercises and how often.    Keep a bladder diary. This helps track how often and how much you urinate over a set period of time. Bring this diary with you to your next visit with the provider. The information can help your provider learn more about your bladder problem.    Lose weight, if advised to by your provider. Excess weight puts pressure on the bladder. Your provider can help you create a weight-loss plan thats right for you. This may include exercising more and making certain diet  changes.    Don't consume foods and drinks that may irritate the bladder. These can include alcohol and caffeinated drinks.    Quit smoking. Smoking and other tobacco use can lead to chronic cough that strains the pelvic floor muscles. Smoking may also damage the bladder and urethra. Talk with your provider about treatments or methods you can use to quit smoking.    If drinking large amounts of fluid causes you to have symptoms, you may be advised to limit your fluid intake. You may also be advised to drink most of your fluids during the day and to limit fluids at night.    If youre worried about urine leakage or accidents, you may wear absorbent pads to catch urine. Change the pads often. This helps reduce discomfort. It may also reduce the risk of skin or bladder infections.  Follow-up care  Follow up with your healthcare provider, or as directed. It may take some to find the right treatment for your problem. Your treatment plan may include special therapies or medicines. Certain procedures or surgery may also be options. Be sure to discuss any questions you have with your provider.  When to seek medical advice  Call the healthcare provider right away if any of these occur:    Fever of 100.4 F (38 C) or higher, or as directed by your provider    Bladder pain or fullness    Abdominal swelling    Nausea or vomiting    Back pain    Weakness, dizziness or fainting  Date Last Reviewed: 10/1/2017    1910-4073 The [employer]. [address] [place] [address]. All rights reserved. This information is not intended as a substitute for professional medical care. Always follow your healthcare professional's instructions.     Patient Education   Your Health Risk Assessment indicates you feel you are not in good emotional health.    Recreation   Recreation is not limited to sports and team events. It includes any activity that provides relaxation, interest, enjoyment, and exercise. Recreation provides an  outlet for physical, mental, and social energy. It can give a sense of worth and achievement. It can help you stay healthy.    Mental Exercise and Social Involvement  Mental and emotional health is as important as physical health. Keep in touch with friends and family. Stay as active as possible. Continue to learn and challenge yourself.   Things you can do to stay mentally active are:    Learn something new, like a foreign language or musical instrument.     Play SCRABBLE or do crossword puzzles. If you cannot find people to play these games with you at home, you can play them with others on your computer through the Internet.     Join a games club--anything from card games to chess or checkers or lawn bowling.     Start a new hobby.     Go back to school.     Volunteer.     Read.     Keep up with world events.       Patient Education   Preventing Falls in the Home  As you get older, falls are more likely. Thats because your reaction time slows. Your muscles and joints may also get stiffer, making them less flexible. Illness, medications, and vision changes can also affect your balance. A fall could leave you unable to live on your own. To make your home safer, follow these tips:    Floors    Put nonskid pads under area rugs.    Remove throw rugs.    Replace worn floor coverings.    Tack carpets firmly to each step on carpeted stairs. Put nonskid strips on the edges of uncarpeted stairs.    Keep floors and stairs free of clutter and cords.    Arrange furniture so there are clear pathways.    Clean up any spills right away.    Bathrooms    Install grab bars in the tub or shower.    Apply nonskid strips or put a nonskid rubber mat in the tub or shower.    Sit on a bath chair to bathe.    Use bathmats with nonskid backing.    Lighting    Keep a flashlight in each room.    Put a nightlight along the pathway between the bedroom and the bathroom.    8360-2029 The [employer]. [address] [place] [address], [place]  [address]. All rights reserved. This information is not intended as a substitute for professional medical care. Always follow your healthcare professional's instructions.           Advance Directive  Patients advance directive was discussed and I am comfortable with the patients wishes.  Patient Education   Personalized Prevention Plan  You are due for the preventive services outlined below.  Your care team is available to assist you in scheduling these services.  If you have already completed any of these items, please share that information with your care team to update in your medical record.  Health Maintenance   Topic Date Due   ? HEPATITIS C SCREENING  1968   ? COLORECTAL CANCER SCREENING  12/21/1986   ? ZOSTER VACCINES (1 of 2) 12/21/2018   ? MAMMOGRAM  10/28/2020   ? MEDICARE ANNUAL WELLNESS VISIT  02/02/2022   ? TD 18+ HE  04/23/2023   ? PAP SMEAR  06/08/2023   ? HPV TEST  06/08/2023   ? LIPID  02/02/2026   ? ADVANCE CARE PLANNING  02/02/2026   ? DEPRESSION ACTION PLAN  Completed   ? HIV SCREENING  Completed   ? TDAP ADULT ONE TIME DOSE  Completed   ? Pneumococcal Vaccine: Pediatrics (0 to 5 Years) and At-Risk Patients (6 to 64 Years)  Aged Out   ? HEPATITIS B VACCINES  Aged Out

## 2021-10-19 ENCOUNTER — OFFICE VISIT (OUTPATIENT)
Dept: INTERNAL MEDICINE | Facility: CLINIC | Age: 51
End: 2021-10-19
Payer: MEDICARE

## 2021-10-19 VITALS
DIASTOLIC BLOOD PRESSURE: 82 MMHG | HEIGHT: 62 IN | OXYGEN SATURATION: 98 % | SYSTOLIC BLOOD PRESSURE: 116 MMHG | HEART RATE: 90 BPM | WEIGHT: 175.1 LBS | BODY MASS INDEX: 32.22 KG/M2

## 2021-10-19 DIAGNOSIS — N92.6 ABNORMAL MENSTRUAL CYCLE: ICD-10-CM

## 2021-10-19 DIAGNOSIS — B37.2 INTERTRIGINOUS CANDIDIASIS: Primary | ICD-10-CM

## 2021-10-19 PROCEDURE — 99214 OFFICE O/P EST MOD 30 MIN: CPT | Performed by: INTERNAL MEDICINE

## 2021-10-19 RX ORDER — CLOTRIMAZOLE AND BETAMETHASONE DIPROPIONATE 10; .64 MG/G; MG/G
CREAM TOPICAL 2 TIMES DAILY
Qty: 45 G | Refills: 1 | Status: SHIPPED | OUTPATIENT
Start: 2021-10-19 | End: 2022-05-27

## 2021-10-19 ASSESSMENT — MIFFLIN-ST. JEOR: SCORE: 1357.5

## 2021-10-19 NOTE — PROGRESS NOTES
"  Office Visit - Follow Up   Minnie Aguilar   50 year old female    Date of Visit: 10/19/2021    Chief Complaint   Patient presents with     Foot Problems     itching, dead skin and peeling inbetween toes, left foot only        Assessment and Plan   1. Intertriginous candidiasis  Discussed importance of cleansing and drying daily, use cream as below as well as some Vaseline to keep the area dry  - clotrimazole-betamethasone (LOTRISONE) 1-0.05 % external cream; Apply topically 2 times daily  Dispense: 45 g; Refill: 1    2. Abnormal menstrual cycle  Discussed menopause at length, consider seeing her gynecologist, or following up with her primary physician    Return in about 4 weeks (around 11/16/2021) for Office visit, if symptoms worsen/fail to improve.     History of Present Illness   This 50 year old woman comes in for evaluation of a foot concern as well as abnormal menstrual cycle.  She has been having some infection between her fifth and fourth toes in the left foot.  She has tried numerous over-the-counter remedies including antifungal and steroid cream.  It works transiently.  She does a good job keeping the area clean and dry.  Also, she had a change in her menses.  She is now back on through menopause.  She is hopeful not to go through menopause because she is still interested in having children.  Her cycle has been irregular    Review of Systems: A comprehensive review of systems was negative except as noted.     Medications, Allergies and Problem List   Reviewed, reconciled and updated  Post Discharge Medication Reconciliation Status:      Physical Exam   General Appearance:   No acute distress    /82 (BP Location: Left arm, Patient Position: Sitting, Cuff Size: Adult Regular)   Pulse 90   Ht 1.575 m (5' 2\")   Wt 79.4 kg (175 lb 1.6 oz)   SpO2 98%   BMI 32.03 kg/m      She has some fungal infection between her toes and some maceration     Additional Information   Current Outpatient Medications "   Medication Sig Dispense Refill     ascorbic acid (VITAMIN C) 1000 MG tablet [ASCORBIC ACID (VITAMIN C) 1000 MG TABLET] Take 1,000 mg by mouth daily after supper.        biotin 10 mg Tab [BIOTIN 10 MG TAB] Take 1 tablet by mouth daily after supper.        cholecalciferol, vitamin D3, (VITAMIN D3) 2,000 unit cap [CHOLECALCIFEROL, VITAMIN D3, (VITAMIN D3) 2,000 UNIT CAP] Take 1 tablet by mouth daily after supper.        clotrimazole-betamethasone (LOTRISONE) 1-0.05 % external cream Apply topically 2 times daily 45 g 1     FERROUS FUMARATE (IRON ORAL) [FERROUS FUMARATE (IRON ORAL)] Take 325 mg by mouth 2 (two) times a day with meals. TABS        MULTIVITAMIN (MULTIPLE VITAMIN ORAL) [MULTIVITAMIN (MULTIPLE VITAMIN ORAL)] Take 1 tablet by mouth daily after supper.        traZODone (DESYREL) 100 MG tablet [TRAZODONE (DESYREL) 100 MG TABLET] Take 3 tablets (300 mg total) by mouth at bedtime. 270 tablet 3     venlafaxine (EFFEXOR-XR) 150 MG 24 hr capsule [VENLAFAXINE (EFFEXOR-XR) 150 MG 24 HR CAPSULE] TAKE 1 CAPSULE(150 MG) BY MOUTH DAILY 90 capsule 3     No Known Allergies  Social History     Tobacco Use     Smoking status: Never Smoker     Smokeless tobacco: Never Used   Substance Use Topics     Alcohol use: Yes     Comment: Alcoholic Drinks/day: occasional      Drug use: No       Review and/or order of clinical lab tests:  Review and/or order of radiology tests:  Review and/or order of medicine tests:  Discussion of test results with performing physician:  Decision to obtain old records and/or obtain history from someone other than the patient:  Review and summarization of old records and/or obtaining history from someone other than the patient and.or discussion of case with another health care provider:  Independent visualization of image, tracing or specimen itself:    Time:      Dariusz Black MD

## 2021-11-22 ENCOUNTER — TELEPHONE (OUTPATIENT)
Dept: INTERNAL MEDICINE | Facility: CLINIC | Age: 51
End: 2021-11-22
Payer: MEDICARE

## 2021-11-22 DIAGNOSIS — J32.9 OTHER SINUSITIS, UNSPECIFIED CHRONICITY: Primary | ICD-10-CM

## 2021-11-26 RX ORDER — AMOXICILLIN 875 MG
875 TABLET ORAL 2 TIMES DAILY
Qty: 14 TABLET | Refills: 0 | Status: SHIPPED | OUTPATIENT
Start: 2021-11-26 | End: 2022-01-25

## 2021-11-26 NOTE — TELEPHONE ENCOUNTER
I did send in her request for amoxil but if her symptoms do not improve, she should consider covid testing.

## 2021-11-26 NOTE — TELEPHONE ENCOUNTER
Attempted to contact patient. Left voice mail requesting a call back to Clinic to discuss symptoms below.

## 2021-11-26 NOTE — TELEPHONE ENCOUNTER
"Contacted patient to discuss call below that she has a sinus infection and wants a prescription for Amoxicillin.   Patient states she has had a sinus infection yearly for the past 32 years so she knows what this is.  Patient having mucous and nasal drip that is dark, yellow in color. No fevers. No pain in face, no sinus pressure. Patient states she doesn't get sinus pain or pressure but she \"knows it is a sinus infection\".   "

## 2021-12-30 DIAGNOSIS — G47.00 INSOMNIA: ICD-10-CM

## 2021-12-30 NOTE — TELEPHONE ENCOUNTER
Refill Request  Medication name: Pending Prescriptions:                       Disp   Refills    traZODone (DESYREL) 100 MG tablet         270 ta*3            Sig: Take 3 tablets (300 mg) by mouth At Bedtime    Who prescribed the medication: PCp  Last refill on medication: 12/15/2020  Requested Pharmacy: [facility]  Last appointment with PCP: 10/19/21  Next appointment: Not due

## 2021-12-31 RX ORDER — TRAZODONE HYDROCHLORIDE 100 MG/1
300 TABLET ORAL AT BEDTIME
Qty: 270 TABLET | Refills: 0 | Status: SHIPPED | OUTPATIENT
Start: 2021-12-31 | End: 2022-04-01

## 2021-12-31 NOTE — TELEPHONE ENCOUNTER
Prescription approved per [facility] Refill Protocol.  Lien Bach RN

## 2022-01-06 ENCOUNTER — IMMUNIZATION (OUTPATIENT)
Dept: NURSING | Facility: CLINIC | Age: 52
End: 2022-01-06
Payer: MEDICARE

## 2022-01-06 PROCEDURE — 91300 PR COVID VAC PFIZER DIL RECON 30 MCG/0.3 ML IM: CPT

## 2022-01-06 PROCEDURE — 0004A PR COVID VAC PFIZER DIL RECON 30 MCG/0.3 ML IM: CPT

## 2022-01-25 ENCOUNTER — VIRTUAL VISIT (OUTPATIENT)
Dept: INTERNAL MEDICINE | Facility: CLINIC | Age: 52
End: 2022-01-25
Payer: MEDICARE

## 2022-01-25 DIAGNOSIS — J01.00 SUBACUTE MAXILLARY SINUSITIS: Primary | ICD-10-CM

## 2022-01-25 PROBLEM — E78.00 HYPERCHOLESTEROLEMIA: Status: ACTIVE | Noted: 2022-01-25

## 2022-01-25 PROBLEM — E55.9 VITAMIN D DEFICIENCY: Status: ACTIVE | Noted: 2022-01-25

## 2022-01-25 PROBLEM — M54.2 NECK PAIN: Status: ACTIVE | Noted: 2019-04-26

## 2022-01-25 PROBLEM — R29.898 MUSCULAR DECONDITIONING: Status: ACTIVE | Noted: 2019-04-26

## 2022-01-25 PROBLEM — D64.9 ANEMIA: Status: ACTIVE | Noted: 2022-01-25

## 2022-01-25 PROCEDURE — 99213 OFFICE O/P EST LOW 20 MIN: CPT | Mod: 95 | Performed by: INTERNAL MEDICINE

## 2022-01-25 RX ORDER — AZITHROMYCIN 500 MG/1
500 TABLET, FILM COATED ORAL DAILY
Qty: 10 TABLET | Refills: 0 | Status: SHIPPED | OUTPATIENT
Start: 2022-01-25 | End: 2022-02-04

## 2022-01-25 NOTE — PROGRESS NOTES
"Minnie is a 51 year old female being evaluated via a billable phone visit, and would like to be contacted via the following  Cell number on file:    Telephone Information:   Mobile [phone]        ASSESSMENT and PLAN:  1. Subacute maxillary sinusitis  Partial improvement to amoxicillin in November.  Offered Augmentin, doxycycline or azithromycin.  Trial of azithromycin and high-dose  - azithromycin (ZITHROMAX) 500 MG tablet; Take 1 tablet (500 mg) by mouth daily for 10 days  Dispense: 10 tablet; Refill: 0     Preventive Care Assessed: Otherwise stable     Patient Instructions   Azithromycin 500 mg daily for 10 days        Medication list carefully reviewed and corrected  Epic Merger Problem list addressed and updated  Return in about 6 months (around 7/25/2022) for using a video visit.    CHIEF COMPLAINT:  Chief Complaint   Patient presents with     URI     persistant sx's - wants abx       HISTORY OF PRESENT ILLNESS:  Minnie is a 51 year old female contacting the clinic today via phone for persisting sinusitis.  She has a sinus infection once or twice yearly.  She was treated with amoxicillin in November and has preferred amoxicillin as her drug of choice.  Unfortunately most recently it only partially improved her symptoms.  She is left with persisting nasal drainage, phlegm, and sinus pressure.  Minimal cough.  No fever or chills.  No recent hospitalizations or surgeries    REVIEW OF SYSTEMS:  No headache    PFSH:  Social History     Social History Narrative     Not on file     Jew    TOBACCO USE:  History   Smoking Status     Never Smoker   Smokeless Tobacco     Never Used       VITALS:  There were no vitals filed for this visit.  There were no vitals taken for this visit. Estimated body mass index is 32.03 kg/m  as calculated from the following:    Height as of 10/19/21: 1.575 m (5' 2\").    Weight as of 10/19/21: 79.4 kg (175 lb 1.6 oz).    PHYSICAL EXAM:  (observations via Phone)  Speech pushed " and somewhat anxious    MEDICATIONS  Current Outpatient Medications   Medication Sig Dispense Refill     azithromycin (ZITHROMAX) 500 MG tablet Take 1 tablet (500 mg) by mouth daily for 10 days 10 tablet 0     ascorbic acid (VITAMIN C) 1000 MG tablet [ASCORBIC ACID (VITAMIN C) 1000 MG TABLET] Take 1,000 mg by mouth daily after supper.        biotin 10 mg Tab [BIOTIN 10 MG TAB] Take 1 tablet by mouth daily after supper.        cholecalciferol, vitamin D3, (VITAMIN D3) 2,000 unit cap [CHOLECALCIFEROL, VITAMIN D3, (VITAMIN D3) 2,000 UNIT CAP] Take 1 tablet by mouth daily after supper.        clotrimazole-betamethasone (LOTRISONE) 1-0.05 % external cream Apply topically 2 times daily 45 g 1     FERROUS FUMARATE (IRON ORAL) [FERROUS FUMARATE (IRON ORAL)] Take 325 mg by mouth 2 (two) times a day with meals. TABS        MULTIVITAMIN (MULTIPLE VITAMIN ORAL) [MULTIVITAMIN (MULTIPLE VITAMIN ORAL)] Take 1 tablet by mouth daily after supper.        traZODone (DESYREL) 100 MG tablet Take 3 tablets (300 mg) by mouth At Bedtime 270 tablet 0     venlafaxine (EFFEXOR-XR) 150 MG 24 hr capsule [VENLAFAXINE (EFFEXOR-XR) 150 MG 24 HR CAPSULE] TAKE 1 CAPSULE(150 MG) BY MOUTH DAILY 90 capsule 3       Notes summarized:   Labs, x-rays, cardiology, GI tests reviewed:   Recent Labs   Lab Test 02/02/21  1333   HGB 12.8      POTASSIUM 3.9   CR 0.82     No results found for: [id number]  No components found for: VITD  Lab Results   Component Value Date    CHOL 231 02/02/2021     New orders: No orders of the defined types were placed in this encounter.      Independent review of:  Supplemental history by:      Patient would like to receive their AVS by Mail a copy     Jb Carson MD      [facility]    Phone Start Time: 2:28 PM  Phone End time:  2:42 PM  Conversation plus orders: 14 minutes  Dictation time:  3 minutes    The visit lasted a total of 15 minutes

## 2022-03-31 DIAGNOSIS — G47.00 INSOMNIA: ICD-10-CM

## 2022-04-01 RX ORDER — TRAZODONE HYDROCHLORIDE 100 MG/1
TABLET ORAL
Qty: 270 TABLET | Refills: 3 | Status: SHIPPED | OUTPATIENT
Start: 2022-04-01 | End: 2023-02-20 | Stop reason: DRUGHIGH

## 2022-04-01 NOTE — TELEPHONE ENCOUNTER
"Last Written Prescription Date:  12/31/21  Last Fill Quantity: 270,  # refills: 0   Last office visit provider:  1/25/22     Requested Prescriptions   Pending Prescriptions Disp Refills     traZODone (DESYREL) 100 MG tablet [Pharmacy Med Name: TRAZODONE 100MG TABLETS] 270 tablet 0     Sig: TAKE 3 TABLETS(300 MG) BY MOUTH AT BEDTIME       Serotonin Modulators Passed - 4/1/2022  3:10 PM        Passed - Recent (12 mo) or future (30 days) visit within the authorizing provider's specialty     Patient has had an office visit with the authorizing provider or a provider within the authorizing providers department within the previous 12 mos or has a future within next 30 days. See \"Patient Info\" tab in inbasket, or \"Choose Columns\" in Meds & Orders section of the refill encounter.              Passed - Medication is active on med list        Passed - Patient is age 18 or older        Passed - No active pregnancy on record        Passed - No positive pregnancy test in past 12 months             Parish Constantino RN 04/01/22 3:10 PM  "

## 2022-05-25 DIAGNOSIS — B37.2 INTERTRIGINOUS CANDIDIASIS: ICD-10-CM

## 2022-05-25 NOTE — TELEPHONE ENCOUNTER
Reason for Call:  Medication or medication refill:     OK TO WAIT FOR PCP PER PATIENT    Do you use a [facility]?  Name of the pharmacy and phone number for the current request:    [facility] - [address]    Name of the medication requested:   Lotrisone 1-0.05% external cream    Other request: n/a    Can we leave a detailed message on this number? YES    Phone number patient can be reached at: Cell number on file:    Telephone Information:   Mobile [phone]       Best Time: anytime    Call taken on 5/25/2022 at 1:45 PM by Ana Pardo

## 2022-05-27 RX ORDER — CLOTRIMAZOLE AND BETAMETHASONE DIPROPIONATE 10; .64 MG/G; MG/G
CREAM TOPICAL 2 TIMES DAILY
Qty: 45 G | Refills: 1 | Status: SHIPPED | OUTPATIENT
Start: 2022-05-27 | End: 2024-02-16

## 2022-06-05 ENCOUNTER — NURSE TRIAGE (OUTPATIENT)
Dept: NURSING | Facility: CLINIC | Age: 52
End: 2022-06-05
Payer: MEDICARE

## 2022-06-05 NOTE — TELEPHONE ENCOUNTER
Patient calling - says she has a rash on both arms.  Rash started last week.  Rash is small clusters of little bumps.  One of the areas on her arm now has a dark spot on it. There are a couple spots on her back as well.  Rash is itchy.    No fever.    Triaged to disposition of See HCP Within 4 Hours (or PCP Triage).  Advised patient of open Urgent Cares.  Patient requesting appointment for tomorrow.  Transferred to scheduling.    Kacey David RN  Triage Nurse Advisor    Reason for Disposition    [1] Purple or blood-colored rash (spots or dots) AND [2] no fever AND [3] sounds well to triager    Additional Information    Negative: [1] Sudden onset of rash (within last 2 hours) AND [2] difficulty with breathing or swallowing    Negative: [1] Life-threatening reaction (anaphylaxis) in the past to similar substance (e.g., food, insect bite/sting, chemical, etc.) AND [2] < 2 hours since exposure    Negative: Shock suspected (e.g., cold/pale/clammy skin, too weak to stand, low BP, rapid pulse)    Negative: Difficult to awaken or acting confused (e.g., disoriented, slurred speech)    Negative: [1] Purple or blood-colored spots or dots AND [2] fever    Negative: Sounds like a life-threatening emergency to the triager    Negative: Insect bites suspected    Negative: Swimmer's Itch suspected    Negative: Sunburn suspected    Negative: Hives suspected    Negative: Measles suspected AND [2] known exposure to measles in past 3 weeks    Negative: [1] Chickenpox suspected AND [2] known exposure to chickenpox in past 3 weeks    Negative: [1] Drug rash suspected AND [2] started taking new medicine within last 2 weeks(Exception: antihistamine, eye drops, ear drops, decongestant or other OTC cough/cold medicines)    Negative: [1] Widespread rash AND [2] bright red, sunburn-like AND [3] current tampon use or nasal packing    Negative: [1] Widespread rash AND [2] bright red, sunburn-like AND [3] wound infection or recent surgery     Negative: [1] Bright red skin AND [2] peels off in sheets    Negative: Stiff neck (unable to touch chin to chest)    Negative: Fever    Negative: Joint pain or swelling    Negative: Rash looks like large or small blisters (i.e., fluid filled bubbles or sacs on the skin)    Negative: Patient sounds very sick or weak to the triager    Protocols used: RASH OR REDNESS - WIDESPREAD-A-AH

## 2022-06-06 ENCOUNTER — OFFICE VISIT (OUTPATIENT)
Dept: URGENT CARE | Facility: URGENT CARE | Age: 52
End: 2022-06-06
Payer: MEDICARE

## 2022-06-06 VITALS
TEMPERATURE: 97.5 F | WEIGHT: 175.1 LBS | BODY MASS INDEX: 32.03 KG/M2 | DIASTOLIC BLOOD PRESSURE: 93 MMHG | SYSTOLIC BLOOD PRESSURE: 145 MMHG | OXYGEN SATURATION: 100 % | HEART RATE: 87 BPM

## 2022-06-06 DIAGNOSIS — Z11.52 ENCOUNTER FOR SCREENING FOR COVID-19: ICD-10-CM

## 2022-06-06 DIAGNOSIS — L23.9 ALLERGIC CONTACT DERMATITIS, UNSPECIFIED TRIGGER: Primary | ICD-10-CM

## 2022-06-06 PROCEDURE — U0003 INFECTIOUS AGENT DETECTION BY NUCLEIC ACID (DNA OR RNA); SEVERE ACUTE RESPIRATORY SYNDROME CORONAVIRUS 2 (SARS-COV-2) (CORONAVIRUS DISEASE [COVID-19]), AMPLIFIED PROBE TECHNIQUE, MAKING USE OF HIGH THROUGHPUT TECHNOLOGIES AS DESCRIBED BY CMS-2020-01-R: HCPCS | Performed by: NURSE PRACTITIONER

## 2022-06-06 PROCEDURE — U0005 INFEC AGEN DETEC AMPLI PROBE: HCPCS | Performed by: NURSE PRACTITIONER

## 2022-06-06 PROCEDURE — 99213 OFFICE O/P EST LOW 20 MIN: CPT | Performed by: NURSE PRACTITIONER

## 2022-06-06 RX ORDER — TRIAMCINOLONE ACETONIDE 5 MG/G
CREAM TOPICAL 2 TIMES DAILY
Qty: 454 G | Refills: 0 | Status: SHIPPED | OUTPATIENT
Start: 2022-06-06 | End: 2022-06-20

## 2022-06-06 RX ORDER — CHLORAL HYDRATE 500 MG
2 CAPSULE ORAL DAILY
COMMUNITY

## 2022-06-06 NOTE — PROGRESS NOTES
Chief Complaint   Patient presents with     Urgent Care     Derm Problem     X2 weeks both arms, behind neck, itchiness, no pain     Covid 19 Testing     Patient request to be COVID tested, no known exposure     SUBJECTIVE:  Minnie Aguilar is a 51 year old female who presents to the clinic today with an itchy rash for 2 weeks. It appears as tiny flesh colored red raised dots along arms, chest, neck. No other symptoms. No known exposures, new foods, pets, travel. She has tried hydrocortisone with some relief. She also would like a covid test for known exposure.    Past Medical History:   Diagnosis Date     Anemia      Anxiety      Depression      Hyperlipidemia      Leiomyoma of uterus      PTSD (post-traumatic stress disorder)      Restless leg syndrome      Vitamin D deficiency      ascorbic acid (VITAMIN C) 1000 MG tablet, [ASCORBIC ACID (VITAMIN C) 1000 MG TABLET] Take 1,000 mg by mouth daily after supper.   biotin 10 mg Tab, [BIOTIN 10 MG TAB] Take 1 tablet by mouth daily after supper.   cholecalciferol, vitamin D3, (VITAMIN D3) 2,000 unit cap, [CHOLECALCIFEROL, VITAMIN D3, (VITAMIN D3) 2,000 UNIT CAP] Take 1 tablet by mouth daily after supper.   clotrimazole-betamethasone (LOTRISONE) 1-0.05 % external cream, Apply topically 2 times daily  fish oil-omega-3 fatty acids 1000 MG capsule, Take 2 g by mouth daily Patient reports taking 3000 MG  MULTIVITAMIN (MULTIPLE VITAMIN ORAL), [MULTIVITAMIN (MULTIPLE VITAMIN ORAL)] Take 1 tablet by mouth daily after supper.   traZODone (DESYREL) 100 MG tablet, TAKE 3 TABLETS(300 MG) BY MOUTH AT BEDTIME  venlafaxine (EFFEXOR-XR) 150 MG 24 hr capsule, [VENLAFAXINE (EFFEXOR-XR) 150 MG 24 HR CAPSULE] TAKE 1 CAPSULE(150 MG) BY MOUTH DAILY  FERROUS FUMARATE (IRON ORAL), [FERROUS FUMARATE (IRON ORAL)] Take 325 mg by mouth 2 (two) times a day with meals. TABS  (Patient not taking: Reported on 6/6/2022)    No current facility-administered medications on file prior to visit.    Social  History     Tobacco Use     Smoking status: Never Smoker     Smokeless tobacco: Never Used   Substance Use Topics     Alcohol use: Yes     Comment: Alcoholic Drinks/day: occasional      No Known Allergies    Review of Systems   All systems negative except for those listed above in HPI.    EXAM:   BP (!) 145/93   Pulse 87   Temp 97.5  F (36.4  C) (Tympanic)   Wt 79.4 kg (175 lb 1.6 oz)   SpO2 100%   BMI 32.03 kg/m      Physical Exam  Vitals reviewed.   Constitutional:       Appearance: Normal appearance.   HENT:      Head: Normocephalic and atraumatic.   Cardiovascular:      Rate and Rhythm: Normal rate.   Pulmonary:      Effort: Pulmonary effort is normal.   Musculoskeletal:         General: Normal range of motion.   Skin:     General: Skin is warm and dry.      Findings: Rash (tiny red fleshy dots on arms, chest, neck) present.   Neurological:      General: No focal deficit present.      Mental Status: She is alert and oriented to person, place, and time.   Psychiatric:         Mood and Affect: Mood normal.         Behavior: Behavior normal.       ASSESSMENT:    ICD-10-CM    1. Encounter for screening for COVID-19  Z11.52 Asymptomatic COVID-19 Virus (Coronavirus) by PCR Nose     PLAN:    Most likely contact dermatitis to unknown trigger  Triamcinolone cream as directed.  Pt wants to hold on prednisone  Eucerin cream to whole body daily, especially right after bathing.  Avoid lotions with a scent as these can be irritating.  Take Zyrtec (cetirizine) every morning and benadryl (diaphenhydramine) every evening.  You are not contagious.  Cool compresses, ice packs, cooler showers for itching relief.  Baking soda paste, calamine lotion or aveeno oatmeal packs for itching.  Rub with affected are with ice cube.  Avoid sunlight and heat.  Avoid scratching to prevent secondary infection.  Dr. Valladares's light blue carmen unscented soap  Free and clear detergant  Watch for any signs of infection - (fever, bright red  color, hot to the touch, firm to the touch,  more pain and/or discharge that is cream- yellow/white/green) and follow up if you notice these as an antibiotic may be needed.  Follow-up with derm / primary care provider / allergist in a month if lingering    Follow up with primary care provider with any problems, questions or concerns or if symptoms worsen or fail to improve. Patient agreed to plan and verbalized understanding.    Debby Dash, EM-BC  [facility]

## 2022-06-06 NOTE — PATIENT INSTRUCTIONS
Most likely contact dermatitis to unknown trigger  Triamcinolone cream as directed.  Pt wants to hold on prednisone  Eucerin cream to whole body daily, especially right after bathing.  Avoid lotions with a scent as these can be irritating.  Take Zyrtec (cetirizine) every morning and benadryl (diaphenhydramine) every evening.  You are not contagious.  Cool compresses, ice packs, cooler showers for itching relief.  Baking soda paste, calamine lotion or aveeno oatmeal packs for itching.  Rub with affected are with ice cube.  Avoid sunlight and heat.  Avoid scratching to prevent secondary infection.  Dr. Valladares's light blue carmen unscented soap  Free and clear detergant  Watch for any signs of infection - (fever, bright red color, hot to the touch, firm to the touch,  more pain and/or discharge that is cream- yellow/white/green) and follow up if you notice these as an antibiotic may be needed.  Follow-up with derm / primary care provider / allergist in a month if lingering

## 2022-06-07 LAB — SARS-COV-2 RNA RESP QL NAA+PROBE: NEGATIVE

## 2022-06-21 ENCOUNTER — OFFICE VISIT (OUTPATIENT)
Dept: URGENT CARE | Facility: URGENT CARE | Age: 52
End: 2022-06-21
Payer: MEDICARE

## 2022-06-21 VITALS
BODY MASS INDEX: 34.41 KG/M2 | TEMPERATURE: 97.3 F | DIASTOLIC BLOOD PRESSURE: 87 MMHG | HEIGHT: 62 IN | OXYGEN SATURATION: 99 % | HEART RATE: 90 BPM | SYSTOLIC BLOOD PRESSURE: 147 MMHG | WEIGHT: 187 LBS

## 2022-06-21 DIAGNOSIS — R21 RASH AND NONSPECIFIC SKIN ERUPTION: ICD-10-CM

## 2022-06-21 DIAGNOSIS — L98.9 SKIN LESION: Primary | ICD-10-CM

## 2022-06-21 PROCEDURE — U0005 INFEC AGEN DETEC AMPLI PROBE: HCPCS | Performed by: FAMILY MEDICINE

## 2022-06-21 PROCEDURE — 99213 OFFICE O/P EST LOW 20 MIN: CPT | Performed by: FAMILY MEDICINE

## 2022-06-21 PROCEDURE — U0003 INFECTIOUS AGENT DETECTION BY NUCLEIC ACID (DNA OR RNA); SEVERE ACUTE RESPIRATORY SYNDROME CORONAVIRUS 2 (SARS-COV-2) (CORONAVIRUS DISEASE [COVID-19]), AMPLIFIED PROBE TECHNIQUE, MAKING USE OF HIGH THROUGHPUT TECHNOLOGIES AS DESCRIBED BY CMS-2020-01-R: HCPCS | Performed by: FAMILY MEDICINE

## 2022-06-21 NOTE — PROGRESS NOTES
Chief Complaint   Patient presents with     Urgent Care     Derm Problem     Pt in clinic to have eval for suspicious spots on arm.       ASSESMENT AND PLAN   Minnie was seen today for urgent care and derm problem.    Diagnoses and all orders for this visit:    Skin lesion  -     Cancel: Asymptomatic COVID-19 Virus (Coronavirus) by PCR Nose; Future  -     Asymptomatic COVID-19 Virus (Coronavirus) by PCR Nose    Rash and nonspecific skin eruption      Apply Cortaid to see if that helps with the symptoms.      Initial differential diagnosis included but was not restricted to   Differential Diagnosis:  Insect Bite: Insect sting, Exagerated local reaction to bite, Hives and Urticaria  Medical Decision Making:  Patient that the skin rash does not look like measles.  If symptoms do not get better over the next 1 week time or if there rash spreads over the trunk area should follow-up for further evaluation and treatment.    Routine discharge counseling was given to the patient and the patient understands that worsening, changing or persistent symptoms should prompt an immediate call or follow up with their primary physician or the emergency department. The importance of appropriate follow up was also discussed with the patient.     I have reviewed the nursing notes.    Time  spent on the date of the encounter doing chart review, patient visit and documentation   see orders in Epic  Pt verbalized and agreed with the plan and is aware of the worsening symptoms for which would need to follow up .  Pt was stable during time of discharge from the clinic     SUBJECTIVE     Minnie Aguilar is a 51 year old female presenting with a chief complaint of    Chief Complaint   Patient presents with     Urgent Care     Derm Problem     Pt in clinic to have eval for suspicious spots on arm.           Minnie Aguilar is a 51 year old female presenting with a chief complaint of of isolated erythematous skin lesions noted over the left upper  extremity 1 in the forearm and 2 in the upper arm area with no other associated symptoms.  Patient is concerned that she might have measles.  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  She has no other URI symptoms she is vaccinated against COVID and wants to be screened for COVID infection.  She is an established patient of [facility].  Onset of symptoms was 2 day(s) ago.  Course of illness is same.    Severity mild  Current and Associated symptoms: skin rash  Treatment measures tried include None tried.  Predisposing factors include None.    Past Medical History:   Diagnosis Date     Anemia      Anxiety      Depression      Hyperlipidemia      Leiomyoma of uterus      PTSD (post-traumatic stress disorder)      Restless leg syndrome      Vitamin D deficiency      Current Outpatient Medications   Medication Sig Dispense Refill     ascorbic acid (VITAMIN C) 1000 MG tablet [ASCORBIC ACID (VITAMIN C) 1000 MG TABLET] Take 1,000 mg by mouth daily after supper.        biotin 10 mg Tab [BIOTIN 10 MG TAB] Take 1 tablet by mouth daily after supper.        cholecalciferol, vitamin D3, (VITAMIN D3) 2,000 unit cap [CHOLECALCIFEROL, VITAMIN D3, (VITAMIN D3) 2,000 UNIT CAP] Take 1 tablet by mouth daily after supper.        clotrimazole-betamethasone (LOTRISONE) 1-0.05 % external cream Apply topically 2 times daily 45 g 1     FERROUS FUMARATE (IRON ORAL) Take 325 mg by mouth 2 times daily (with meals)       fish oil-omega-3 fatty acids 1000 MG capsule Take 2 g by mouth daily Patient reports taking 3000 MG       MULTIVITAMIN (MULTIPLE VITAMIN ORAL) [MULTIVITAMIN (MULTIPLE VITAMIN ORAL)] Take 1 tablet by mouth daily after supper.        traZODone (DESYREL) 100 MG tablet TAKE 3 TABLETS(300 MG) BY MOUTH AT BEDTIME 270 tablet 3     venlafaxine (EFFEXOR-XR) 150 MG 24 hr capsule [VENLAFAXINE (EFFEXOR-XR) 150 MG 24 HR CAPSULE] TAKE 1 CAPSULE(150 MG) BY MOUTH DAILY 90  "capsule 3     Social History     Tobacco Use     Smoking status: Never Smoker     Smokeless tobacco: Never Used   Substance Use Topics     Alcohol use: Yes     Comment: Alcoholic Drinks/day: occasional      Family History   Problem Relation Age of Onset     Early Death Mother      Hodgkin's lymphoma Mother 18.00        cause of death     Vision Loss Father      Mental Illness Maternal Grandmother      Alcoholism Maternal Grandfather      Diabetes Maternal Grandfather      Cerebrovascular Disease Maternal Grandfather      Cancer Paternal Uncle      Breast Cancer No family hx of          ROS:    10 point ROS of systems including Constitutional, Eyes, Respiratory, Cardiovascular, Gastroenterology, Genitourinary, Integumentary, Muscularskeletal, Psychiatric ,neurological were all negative except for pertinent positives noted in my HPI         OBJECTIVE:    BP (!) 147/87   Pulse 90   Temp 97.3  F (36.3  C) (Temporal)   Ht 1.562 m (5' 1.5\")   Wt 84.8 kg (187 lb)   SpO2 99%   BMI 34.76 kg/m    GENERAL APPEARANCE: healthy, alert and no distress  EYES: EOMI,  PERRL, conjunctiva clear  HENT: ear canals and TM's normal.  Nose and mouth without ulcers, erythema or lesions  RESP: lungs clear to auscultation - no rales, rhonchi or wheezes  CV: regular rates and rhythm, normal S1 S2, no murmur noted  SKIN: Discrete erythematous lesions noted over the left upper extremity couple in the forearm and couple in the upper arm with no surrounding signs of cellulitis   Lesions look like discrete macular lesions possible bug bite  PSYCH: mentation appears normal  Physical Exam      (Note was completed, in part, with [employer] voice-recognition software. Documentation reviewed, but some grammatical, spelling, and word errors may remain.)  Queenie Gomez MD.                  "

## 2022-06-22 LAB — SARS-COV-2 RNA RESP QL NAA+PROBE: NEGATIVE

## 2022-07-13 ENCOUNTER — IMMUNIZATION (OUTPATIENT)
Dept: NURSING | Facility: CLINIC | Age: 52
End: 2022-07-13
Payer: MEDICARE

## 2022-07-13 PROCEDURE — 91305 COVID-19,PF,PFIZER (12+ YRS): CPT

## 2022-07-13 PROCEDURE — 0054A COVID-19,PF,PFIZER (12+ YRS): CPT

## 2022-10-04 PROBLEM — Z78.9 OTHER SPECIFIED HEALTH STATUS: Status: ACTIVE | Noted: 2019-09-19

## 2022-11-11 DIAGNOSIS — F32.A DEPRESSION: ICD-10-CM

## 2022-11-12 RX ORDER — VENLAFAXINE HYDROCHLORIDE 150 MG/1
CAPSULE, EXTENDED RELEASE ORAL
Qty: 90 CAPSULE | Refills: 3 | OUTPATIENT
Start: 2022-11-12

## 2022-11-14 ENCOUNTER — NURSE TRIAGE (OUTPATIENT)
Dept: NURSING | Facility: CLINIC | Age: 52
End: 2022-11-14

## 2022-11-14 NOTE — TELEPHONE ENCOUNTER
Left message recommending patient to make a virtual visit appointment for tomorrow, no details provided about patient symptoms. Did provide phone number for central scheduling so that patient can call to schedule this appointment.

## 2022-11-14 NOTE — TELEPHONE ENCOUNTER
"Nurse Triage SBAR    Situation:   -Medication request    Background:   -Patient calling, It is okay to leave a detailed message at this number.     Assessment:   -She thinks she has a sinus infection (she has had many of these in the past)  -Symptoms this morning  -Raspy voice  -Cough  -Nasal congestion  -Facal  Pressure - very uncofertable  -Eyes and sinus feel \"watery\"   -Forehead feels warm (she dose not have thermometer at home)  -Resting srip for amaoxacill     Recommendation:   -Go to Office now>> Go to /[facility]  -Patient declines this disposition, she dose not have a car and dose not \"feel like going out of her house\".  She would be willing to take a bus to the pharmacy if needed.  -FNA RN informed her the care team may not be willing to prescribe without being seen  -FNA RN routing this message on patient request  -Care team: please call patient back at [phone] and give recommendations.    LISANDRO HENDERSON RN on 11/14/2022 at 3:29 PM         Reason for Disposition    Redness or swelling on the cheek, forehead, or around the eye    Additional Information    Negative: Sounds like a life-threatening emergency to the triager    Negative: Difficulty breathing, and not from stuffy nose (e.g., not relieved by cleaning out the nose)    Negative: SEVERE headache and has fever    Negative: Patient sounds very sick or weak to the triager    Negative: Severe headache    Negative: SEVERE sinus pain    Protocols used: SINUS PAIN OR CONGESTION-A-OH      "

## 2022-11-15 NOTE — TELEPHONE ENCOUNTER
Left voicemail for patient indicated [facility] does have appointments available tomorrow or central [facility] Phone numbers were provided.

## 2023-01-03 ENCOUNTER — TELEPHONE (OUTPATIENT)
Dept: INTERNAL MEDICINE | Facility: CLINIC | Age: 53
End: 2023-01-03

## 2023-01-03 NOTE — TELEPHONE ENCOUNTER
01/03 Pt came in and dropped off form to be completed for [employer] will be brought upstairs today call pt when ready to  [phone]

## 2023-01-16 DIAGNOSIS — F32.A DEPRESSION: ICD-10-CM

## 2023-01-16 RX ORDER — VENLAFAXINE HYDROCHLORIDE 150 MG/1
CAPSULE, EXTENDED RELEASE ORAL
Qty: 90 CAPSULE | Refills: 3 | Status: SHIPPED | OUTPATIENT
Start: 2023-01-16 | End: 2023-02-20

## 2023-01-16 NOTE — TELEPHONE ENCOUNTER
January 16, 2023    [facility] Forms: Minnie picked up [employer]  paperwork from the .    Kely Curran

## 2023-02-20 ENCOUNTER — OFFICE VISIT (OUTPATIENT)
Dept: FAMILY MEDICINE | Facility: CLINIC | Age: 53
End: 2023-02-20
Payer: MEDICARE

## 2023-02-20 VITALS
DIASTOLIC BLOOD PRESSURE: 78 MMHG | BODY MASS INDEX: 34.12 KG/M2 | SYSTOLIC BLOOD PRESSURE: 126 MMHG | RESPIRATION RATE: 22 BRPM | WEIGHT: 192.6 LBS | TEMPERATURE: 97.9 F | OXYGEN SATURATION: 98 % | HEART RATE: 96 BPM | HEIGHT: 63 IN

## 2023-02-20 DIAGNOSIS — F43.10 POSTTRAUMATIC STRESS DISORDER: Primary | ICD-10-CM

## 2023-02-20 DIAGNOSIS — F51.04 PSYCHOPHYSIOLOGICAL INSOMNIA: ICD-10-CM

## 2023-02-20 DIAGNOSIS — Z12.31 VISIT FOR SCREENING MAMMOGRAM: ICD-10-CM

## 2023-02-20 DIAGNOSIS — L80: ICD-10-CM

## 2023-02-20 DIAGNOSIS — Z13.89 SCREENING FOR BLOOD OR PROTEIN IN URINE: ICD-10-CM

## 2023-02-20 DIAGNOSIS — Z13.29 SCREENING FOR ENDOCRINE, NUTRITIONAL, METABOLIC AND IMMUNITY DISORDER: ICD-10-CM

## 2023-02-20 DIAGNOSIS — Z11.59 NEED FOR HEPATITIS C SCREENING TEST: ICD-10-CM

## 2023-02-20 DIAGNOSIS — Z13.0 SCREENING FOR DEFICIENCY ANEMIA: ICD-10-CM

## 2023-02-20 DIAGNOSIS — Z13.0 SCREENING FOR ENDOCRINE, NUTRITIONAL, METABOLIC AND IMMUNITY DISORDER: ICD-10-CM

## 2023-02-20 DIAGNOSIS — Z12.4 CERVICAL CANCER SCREENING: ICD-10-CM

## 2023-02-20 DIAGNOSIS — R69 TAKING MEDICATION FOR CHRONIC DISEASE: ICD-10-CM

## 2023-02-20 DIAGNOSIS — Z13.21 SCREENING FOR ENDOCRINE, NUTRITIONAL, METABOLIC AND IMMUNITY DISORDER: ICD-10-CM

## 2023-02-20 DIAGNOSIS — F33.2 SEVERE RECURRENT MAJOR DEPRESSION WITHOUT PSYCHOTIC FEATURES (H): ICD-10-CM

## 2023-02-20 DIAGNOSIS — Z13.228 SCREENING FOR ENDOCRINE, NUTRITIONAL, METABOLIC AND IMMUNITY DISORDER: ICD-10-CM

## 2023-02-20 LAB
ALBUMIN SERPL BCG-MCNC: 4.5 G/DL (ref 3.5–5.2)
ALBUMIN UR-MCNC: NEGATIVE MG/DL
ALP SERPL-CCNC: 76 U/L (ref 35–104)
ALT SERPL W P-5'-P-CCNC: 23 U/L (ref 10–35)
ANION GAP SERPL CALCULATED.3IONS-SCNC: 11 MMOL/L (ref 7–15)
APPEARANCE UR: CLEAR
AST SERPL W P-5'-P-CCNC: 29 U/L (ref 10–35)
BACTERIA #/AREA URNS HPF: ABNORMAL /HPF
BILIRUB SERPL-MCNC: 0.3 MG/DL
BILIRUB UR QL STRIP: NEGATIVE
BUN SERPL-MCNC: 10.5 MG/DL (ref 6–20)
CALCIUM SERPL-MCNC: 9.8 MG/DL (ref 8.6–10)
CHLORIDE SERPL-SCNC: 103 MMOL/L (ref 98–107)
COLOR UR AUTO: YELLOW
CREAT SERPL-MCNC: 0.79 MG/DL (ref 0.51–0.95)
DEPRECATED HCO3 PLAS-SCNC: 24 MMOL/L (ref 22–29)
GFR SERPL CREATININE-BSD FRML MDRD: 88 ML/MIN/1.73M2
GLUCOSE SERPL-MCNC: 124 MG/DL (ref 70–99)
GLUCOSE UR STRIP-MCNC: NEGATIVE MG/DL
HGB UR QL STRIP: ABNORMAL
KETONES UR STRIP-MCNC: NEGATIVE MG/DL
LEUKOCYTE ESTERASE UR QL STRIP: NEGATIVE
NITRATE UR QL: NEGATIVE
PH UR STRIP: 6 [PH] (ref 5–8)
POTASSIUM SERPL-SCNC: 4.9 MMOL/L (ref 3.4–5.3)
PROT SERPL-MCNC: 7.8 G/DL (ref 6.4–8.3)
RBC #/AREA URNS AUTO: ABNORMAL /HPF
SODIUM SERPL-SCNC: 138 MMOL/L (ref 136–145)
SP GR UR STRIP: 1.01 (ref 1–1.03)
SQUAMOUS #/AREA URNS AUTO: ABNORMAL /LPF
TSH SERPL DL<=0.005 MIU/L-ACNC: 1.43 UIU/ML (ref 0.3–4.2)
UROBILINOGEN UR STRIP-ACNC: 0.2 E.U./DL
WBC #/AREA URNS AUTO: ABNORMAL /HPF

## 2023-02-20 PROCEDURE — 99214 OFFICE O/P EST MOD 30 MIN: CPT | Performed by: FAMILY MEDICINE

## 2023-02-20 PROCEDURE — 86803 HEPATITIS C AB TEST: CPT | Performed by: FAMILY MEDICINE

## 2023-02-20 PROCEDURE — 81001 URINALYSIS AUTO W/SCOPE: CPT | Performed by: FAMILY MEDICINE

## 2023-02-20 PROCEDURE — 96127 BRIEF EMOTIONAL/BEHAV ASSMT: CPT | Performed by: FAMILY MEDICINE

## 2023-02-20 PROCEDURE — 80053 COMPREHEN METABOLIC PANEL: CPT | Performed by: FAMILY MEDICINE

## 2023-02-20 PROCEDURE — G0145 SCR C/V CYTO,THINLAYER,RESCR: HCPCS | Performed by: FAMILY MEDICINE

## 2023-02-20 PROCEDURE — 87624 HPV HI-RISK TYP POOLED RSLT: CPT | Performed by: FAMILY MEDICINE

## 2023-02-20 PROCEDURE — 84443 ASSAY THYROID STIM HORMONE: CPT | Performed by: FAMILY MEDICINE

## 2023-02-20 PROCEDURE — 36415 COLL VENOUS BLD VENIPUNCTURE: CPT | Performed by: FAMILY MEDICINE

## 2023-02-20 RX ORDER — VENLAFAXINE HYDROCHLORIDE 150 MG/1
CAPSULE, EXTENDED RELEASE ORAL
Qty: 90 CAPSULE | Refills: 1 | Status: SHIPPED | OUTPATIENT
Start: 2023-02-20 | End: 2024-03-21

## 2023-02-20 RX ORDER — TRAZODONE HYDROCHLORIDE 100 MG/1
TABLET ORAL
Qty: 270 TABLET | Refills: 1 | Status: SHIPPED | OUTPATIENT
Start: 2023-02-20 | End: 2023-11-24

## 2023-02-20 ASSESSMENT — PATIENT HEALTH QUESTIONNAIRE - PHQ9
SUM OF ALL RESPONSES TO PHQ QUESTIONS 1-9: 7
10. IF YOU CHECKED OFF ANY PROBLEMS, HOW DIFFICULT HAVE THESE PROBLEMS MADE IT FOR YOU TO DO YOUR WORK, TAKE CARE OF THINGS AT HOME, OR GET ALONG WITH OTHER PEOPLE: NOT DIFFICULT AT ALL
SUM OF ALL RESPONSES TO PHQ QUESTIONS 1-9: 7
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
## 2023-02-21 LAB — HCV AB SERPL QL IA: NONREACTIVE

## 2023-02-22 ENCOUNTER — TELEPHONE (OUTPATIENT)
Dept: INTERNAL MEDICINE | Facility: CLINIC | Age: 53
End: 2023-02-22

## 2023-02-22 NOTE — TELEPHONE ENCOUNTER
----- Message from Nasrin Holt MD sent at 2/22/2023  3:30 PM CST -----  If she was not fasting for the blood work, then her labs are fine.  If she was fasting, we need to add a HgA1c to the blood in lab to assess for diabetes.  Her other labs were good.

## 2023-02-24 LAB
BKR LAB AP GYN ADEQUACY: NORMAL
BKR LAB AP GYN INTERPRETATION: NORMAL
BKR LAB AP HPV REFLEX: NORMAL
BKR LAB AP LMP: NORMAL
BKR LAB AP PREVIOUS ABNORMAL: NORMAL
PATH REPORT.COMMENTS IMP SPEC: NORMAL
PATH REPORT.COMMENTS IMP SPEC: NORMAL
PATH REPORT.RELEVANT HX SPEC: NORMAL

## 2023-02-27 LAB
HUMAN PAPILLOMA VIRUS 16 DNA: NEGATIVE
HUMAN PAPILLOMA VIRUS 18 DNA: NEGATIVE
HUMAN PAPILLOMA VIRUS FINAL DIAGNOSIS: NORMAL
HUMAN PAPILLOMA VIRUS OTHER HR: NEGATIVE

## 2023-06-16 DIAGNOSIS — F33.2 SEVERE RECURRENT MAJOR DEPRESSION WITHOUT PSYCHOTIC FEATURES (H): ICD-10-CM

## 2023-06-17 RX ORDER — VENLAFAXINE HYDROCHLORIDE 150 MG/1
CAPSULE, EXTENDED RELEASE ORAL
Qty: 90 CAPSULE | Refills: 1 | OUTPATIENT
Start: 2023-06-17

## 2023-08-04 ENCOUNTER — TELEPHONE (OUTPATIENT)
Dept: FAMILY MEDICINE | Facility: CLINIC | Age: 53
End: 2023-08-04

## 2023-08-04 ENCOUNTER — VIRTUAL VISIT (OUTPATIENT)
Dept: INTERNAL MEDICINE | Facility: CLINIC | Age: 53
End: 2023-08-04
Payer: MEDICARE

## 2023-08-04 DIAGNOSIS — J01.00 SUBACUTE MAXILLARY SINUSITIS: Primary | ICD-10-CM

## 2023-08-04 DIAGNOSIS — F33.2 SEVERE RECURRENT MAJOR DEPRESSION WITHOUT PSYCHOTIC FEATURES (H): ICD-10-CM

## 2023-08-04 PROCEDURE — 99213 OFFICE O/P EST LOW 20 MIN: CPT | Mod: 95 | Performed by: INTERNAL MEDICINE

## 2023-08-04 RX ORDER — ZOSTER VACCINE RECOMBINANT, ADJUVANTED 50 MCG/0.5
KIT INTRAMUSCULAR
COMMUNITY
Start: 2023-04-26 | End: 2023-08-04

## 2023-08-04 RX ORDER — AZITHROMYCIN 500 MG/1
500 TABLET, FILM COATED ORAL DAILY
Qty: 7 TABLET | Refills: 0 | Status: SHIPPED | OUTPATIENT
Start: 2023-08-04 | End: 2023-08-17

## 2023-08-04 RX ORDER — GUAIFENESIN 600 MG/1
600 TABLET, EXTENDED RELEASE ORAL 2 TIMES DAILY
Qty: 60 TABLET | Refills: 2 | Status: SHIPPED | OUTPATIENT
Start: 2023-08-04 | End: 2024-02-16

## 2023-08-04 RX ORDER — LIDOCAINE HYDROCHLORIDE 20 MG/ML
15 SOLUTION OROPHARYNGEAL
Qty: 100 ML | Refills: 3 | Status: SHIPPED | OUTPATIENT
Start: 2023-08-04 | End: 2024-02-16

## 2023-08-04 RX ORDER — BENZONATATE 200 MG/1
200 CAPSULE ORAL 3 TIMES DAILY PRN
Qty: 20 CAPSULE | Refills: 1 | Status: SHIPPED | OUTPATIENT
Start: 2023-08-04 | End: 2024-07-31

## 2023-08-04 RX ORDER — CODEINE PHOSPHATE AND GUAIFENESIN 10; 100 MG/5ML; MG/5ML
1-2 SOLUTION ORAL EVERY 6 HOURS PRN
Qty: 240 ML | Refills: 0 | Status: SHIPPED | OUTPATIENT
Start: 2023-08-04 | End: 2024-02-16

## 2023-08-04 ASSESSMENT — PATIENT HEALTH QUESTIONNAIRE - PHQ9
10. IF YOU CHECKED OFF ANY PROBLEMS, HOW DIFFICULT HAVE THESE PROBLEMS MADE IT FOR YOU TO DO YOUR WORK, TAKE CARE OF THINGS AT HOME, OR GET ALONG WITH OTHER PEOPLE: NOT DIFFICULT AT ALL
SUM OF ALL RESPONSES TO PHQ QUESTIONS 1-9: 3
SUM OF ALL RESPONSES TO PHQ QUESTIONS 1-9: 3

## 2023-08-04 NOTE — TELEPHONE ENCOUNTER
Patient has medicare, medicare does not cover shingles vaccines in clinic. Pt should go to local pharmacy for this vaccine series.     Pt is currently in visit with Dr. Carson, will call patient at a later time.

## 2023-08-04 NOTE — PROGRESS NOTES
Minnie is a 52 year old female being evaluated via a billable phone visit, and would like to be contacted via the following  Cell phone/Mobile:   Telephone Information:   Mobile [phone]       ASSESSMENT and PLAN:  1. Subacute maxillary sinusitis  We will treat.  Previous inadequate response to amoxicillin last year.  We will treat with azithromycin.  Encourage further testing to determine why she develops yearly recurrence  - guaiFENesin (MUCINEX) 600 MG 12 hr tablet; Take 1 tablet (600 mg) by mouth 2 times daily  Dispense: 60 tablet; Refill: 2  - lidocaine, viscous, (XYLOCAINE) 2 % solution; Swish and swallow 15 mLs in mouth every 3 hours as needed for moderate pain Max 8 doses/24 hour period.  Dispense: 100 mL; Refill: 3  - benzonatate (TESSALON) 200 MG capsule; Take 1 capsule (200 mg) by mouth 3 times daily as needed for cough  Dispense: 20 capsule; Refill: 1  - azithromycin (ZITHROMAX) 500 MG tablet; Take 1 tablet (500 mg) by mouth daily for 7 days  Dispense: 7 tablet; Refill: 0  - guaiFENesin-codeine (ROBITUSSIN AC) 100-10 MG/5ML solution; Take 5-10 mLs by mouth every 6 hours as needed for cough  Dispense: 240 mL; Refill: 0    2. Severe recurrent major depression without psychotic features (H)  Stable on trazodone.  Meds will not interfere       Patient Instructions   Azithromycin 500 mg daily for 7 days    Benzonatate 200 mg every 8 hours as needed    Guaifenesin 600 mg twice daily    Robitussin with codeine as needed    Viscous lidocaine as needed    CT of sinuses offered    Consider evaluation for snoring    Call back 2 to 3 days if no improvement            Return if symptoms worsen or fail to improve, for with PCP.         CHIEF COMPLAINT:  Chief Complaint   Patient presents with    office visit    Recheck Medication     Pt reports that she has a possible sinus infection for about 1-2 weeks, mucus, sore throat.           8/4/2023    11:50 AM   Additional Questions   Roomed by nicolás   Accompanied by  "alone         8/4/2023    11:50 AM   Patient Reported Additional Medications   Patient reports taking the following new medications none       HISTORY OF PRESENT ILLNESS:  Minnie is a 52 year old female contacting the clinic today via phone for complaints of sinusitis.  She reports illness lasting approximately 2 weeks with sore throat, and heavy mucus congestion from her sinuses.  She went to the emergency room 2 days ago to be tested for virus.  She has been vaccinated.  She gets a sinus infection yearly.  We spoke in January 2022 when she had been only partially improved by a course of amoxicillin    We discussed that yearly sinus infections are abnormal.  She does snore.  She has never had imaging of her sinuses.  She does not take an ACE inhibitor.  She is currently not interested in evaluating further      History of Present Illness       Reason for visit:  Possible sinus infection  Symptom onset:  1-2 weeks ago  Symptoms include:  Mucus, sore throat  Symptom intensity:  Moderate  Symptom progression:  Staying the same  Had these symptoms before:  No  What makes it worse:  Swallowing  What makes it better:  Not sure    She eats 2-3 servings of fruits and vegetables daily.She consumes 0 sweetened beverage(s) daily.She exercises with enough effort to increase her heart rate 9 or less minutes per day.  She exercises with enough effort to increase her heart rate 3 or less days per week.   She is taking medications regularly.    REVIEW OF SYSTEMS:  No wheezing or shortness of breath    PFSH:  Social History     Social History Narrative    Not on file       TOBACCO USE:  History   Smoking Status    Never   Smokeless Tobacco    Never       VITALS:  There were no vitals filed for this visit.  LMP  (LMP Unknown)  Estimated body mass index is 34.67 kg/m  as calculated from the following:    Height as of 2/20/23: 1.588 m (5' 2.5\").    Weight as of 2/20/23: 87.4 kg (192 lb 9.6 oz).    PHYSICAL EXAM:  (observations via " Phone)  Alert with occasional cough.  Slightly anxious    MEDICATIONS  Current Outpatient Medications   Medication Sig Dispense Refill    ascorbic acid (VITAMIN C) 1000 MG tablet [ASCORBIC ACID (VITAMIN C) 1000 MG TABLET] Take 1,000 mg by mouth daily after supper.       azithromycin (ZITHROMAX) 500 MG tablet Take 1 tablet (500 mg) by mouth daily for 7 days 7 tablet 0    benzonatate (TESSALON) 200 MG capsule Take 1 capsule (200 mg) by mouth 3 times daily as needed for cough 20 capsule 1    biotin 10 mg Tab [BIOTIN 10 MG TAB] Take 1 tablet by mouth daily after supper.       cholecalciferol, vitamin D3, (VITAMIN D3) 2,000 unit cap [CHOLECALCIFEROL, VITAMIN D3, (VITAMIN D3) 2,000 UNIT CAP] Take 1 tablet by mouth daily after supper.       clotrimazole-betamethasone (LOTRISONE) 1-0.05 % external cream Apply topically 2 times daily 45 g 1    fish oil-omega-3 fatty acids 1000 MG capsule Take 2 g by mouth daily Patient reports taking 3000 MG      guaiFENesin (MUCINEX) 600 MG 12 hr tablet Take 1 tablet (600 mg) by mouth 2 times daily 60 tablet 2    guaiFENesin-codeine (ROBITUSSIN AC) 100-10 MG/5ML solution Take 5-10 mLs by mouth every 6 hours as needed for cough 240 mL 0    lidocaine, viscous, (XYLOCAINE) 2 % solution Swish and swallow 15 mLs in mouth every 3 hours as needed for moderate pain Max 8 doses/24 hour period. 100 mL 3    MULTIVITAMIN (MULTIPLE VITAMIN ORAL) [MULTIVITAMIN (MULTIPLE VITAMIN ORAL)] Take 1 tablet by mouth daily after supper.       traZODone (DESYREL) 100 MG tablet 3 po qhs 270 tablet 1    venlafaxine (EFFEXOR XR) 150 MG 24 hr capsule [VENLAFAXINE (EFFEXOR-XR) 150 MG 24 HR CAPSULE] TAKE 1 CAPSULE(150 MG) BY MOUTH DAILY 90 capsule 1       Notes summarized:   Labs, x-rays, cardiology, GI tests reviewed: No imaging of sinuses or head noted  Recent Labs   Lab Test 02/20/23  1250      POTASSIUM 4.9   CR 0.79   TSH 1.43     Lab Results   Component Value Date    INTHH11OXE Negative 06/21/2022     Lab  Results   Component Value Date    CHOL 231 02/02/2021     New orders: No orders of the defined types were placed in this encounter.      Independent review of:    Patient would like to receive their AVS by Mail a copy    Jb Carson MD  [facility]    Phone-Visit Details  Type of service:  Phone Visit  Patient has given verbal consent to a Phone visit?  Yes  How would you like to obtain your AVS?  MyChart  Will anyone else be joining your phone visit, giving supplemental history? No    Originating location (pt location): Home    Distant Location (provider location):  Off-site    Phone Start Time: 12:31 PM  Phone End time:   12:51 PM  Conversation plus orders: 20 minutes  Dictation time:  3 minutes    The visit lasted a total of 23 minutes

## 2023-08-04 NOTE — PATIENT INSTRUCTIONS
Azithromycin 500 mg daily for 7 days    Benzonatate 200 mg every 8 hours as needed    Guaifenesin 600 mg twice daily    Robitussin with codeine as needed    Viscous lidocaine as needed    CT of sinuses offered    Consider evaluation for snoring    Call back 2 to 3 days if no improvement

## 2023-08-04 NOTE — TELEPHONE ENCOUNTER
Order/Referral Request    Who is requesting: Patient    Orders being requested: Shingles Vacc    Reason service is needed/diagnosis: n/a    When are orders needed by: ASAP    Has this been discussed with Provider: No    Does patient have a preference on a Group/Provider/Facility? ?    Does patient have an appointment scheduled?: No    Where to send orders: Place orders within Epic    Okay to leave a detailed message?: Yes at Cell number on file:    Telephone Information:   Mobile [phone]

## 2023-08-04 NOTE — PROGRESS NOTES
Minnie is a 52 year old who is being evaluated via a billable telephone visit.      What phone number would you like to be contacted at? [phone]  How would you like to obtain your AVS? Mail a copy  {PROVIDER LOCATION On-site should be selected for visits conducted from your clinic location or adjoining [facility] hospital, academic office, or other nearby [facility] building. Off-site should be selected for all other provider locations, including home:898265}  Distant Location (provider location):  Off-site    {PROVIDER CHARTING PREFERENCE:112572}    Subjective   Minnie is a 52 year old, presenting for the following health issues:  office visit and Recheck Medication (Pt reports that she has a possible sinus infection for about 1-2 weeks, mucus, sore throat.)      8/4/2023    11:50 AM   Additional Questions   Roomed by nicolás   Accompanied by chrissie         8/4/2023    11:50 AM   Patient Reported Additional Medications   Patient reports taking the following new medications none       History of Present Illness       Reason for visit:  Possible sinus infection  Symptom onset:  1-2 weeks ago  Symptoms include:  Mucus, sore throat  Symptom intensity:  Moderate  Symptom progression:  Staying the same  Had these symptoms before:  No  What makes it worse:  Swallowing  What makes it better:  Not sure    She eats 2-3 servings of fruits and vegetables daily.She consumes 0 sweetened beverage(s) daily.She exercises with enough effort to increase her heart rate 9 or less minutes per day.  She exercises with enough effort to increase her heart rate 3 or less days per week.   She is taking medications regularly.       {SUPERLIST (Optional):431969}  Pt reports that she is having this visit for possible sinus infection with symptoms of mucus and sore throat      Review of Systems   {ROS COMP (Optional):619603}      Objective           Vitals:  No vitals were obtained today due to virtual visit.    Physical Exam   {GENERAL  APPEARANCE:50}  PSYCH: Alert and oriented times 3; coherent speech, normal   rate and volume, able to articulate logical thoughts, able   to abstract reason, no tangential thoughts, no hallucinations   or delusions  Her affect is { :5188424}  RESP: No cough, no audible wheezing, able to talk in full sentences  Remainder of exam unable to be completed due to telephone visits    {Diagnostic Test Results (Optional):185250}    {AMBULATORY ATTESTATION (Optional):419025}        Phone call duration: *** minutes

## 2023-08-16 ENCOUNTER — TELEPHONE (OUTPATIENT)
Dept: FAMILY MEDICINE | Facility: CLINIC | Age: 53
End: 2023-08-16
Payer: MEDICARE

## 2023-08-16 NOTE — TELEPHONE ENCOUNTER
General Call      Reason for Call:  pt stating she is still having symptoms of the Sinusitis - coughing up mucous    Requesting a refill on her Azithromycin  Pharm:  [facility] - [place] and [address]    What are your questions or concerns:  n/a    Date of last appointment with provider: n/a    Okay to leave a detailed message?: Yes at Cell number on file:    Telephone Information:   Mobile [phone]

## 2023-08-17 ENCOUNTER — VIRTUAL VISIT (OUTPATIENT)
Dept: INTERNAL MEDICINE | Facility: CLINIC | Age: 53
End: 2023-08-17
Payer: MEDICARE

## 2023-08-17 DIAGNOSIS — J01.00 SUBACUTE MAXILLARY SINUSITIS: ICD-10-CM

## 2023-08-17 PROCEDURE — 99213 OFFICE O/P EST LOW 20 MIN: CPT | Mod: 95 | Performed by: INTERNAL MEDICINE

## 2023-08-17 RX ORDER — AZITHROMYCIN 500 MG/1
500 TABLET, FILM COATED ORAL DAILY
Qty: 7 TABLET | Refills: 0 | Status: SHIPPED | OUTPATIENT
Start: 2023-08-17 | End: 2023-09-18

## 2023-08-17 RX ORDER — PREDNISONE 10 MG/1
10 TABLET ORAL EVERY MORNING
Qty: 4 TABLET | Refills: 0 | Status: SHIPPED | OUTPATIENT
Start: 2023-08-17 | End: 2023-08-21

## 2023-08-17 NOTE — PROGRESS NOTES
Minnie is a 52 year old who is being evaluated via a billable telephone visit.      What phone number would you like to be contacted at? [phone]  How would you like to obtain your AVS? Mail a copy  {PROVIDER LOCATION On-site should be selected for visits conducted from your clinic location or adjoining [facility] hospital, academic office, or other nearby [facility] building. Off-site should be selected for all other provider locations, including home:999040}  Distant Location (provider location):  {virtual location provider:164042}    {PROVIDER CHARTING PREFERENCE:698283}    Subjective   Minnie is a 52 year old, presenting for the following health issues:  Recheck Medication and Sinus Problem (Still having symptoms)      8/17/2023     8:48 AM   Additional Questions   Roomed by hortencia bellamy       Sinus Problem          {SUPERLIST (Optional):796574}  {additonal problems for provider to add (Optional):540725}      Review of Systems   {ROS COMP (Optional):691079}      Objective           Vitals:  No vitals were obtained today due to virtual visit.    Physical Exam   {GENERAL APPEARANCE:50}  PSYCH: Alert and oriented times 3; coherent speech, normal   rate and volume, able to articulate logical thoughts, able   to abstract reason, no tangential thoughts, no hallucinations   or delusions  Her affect is { :1031983}  RESP: No cough, no audible wheezing, able to talk in full sentences  Remainder of exam unable to be completed due to telephone visits    {Diagnostic Test Results (Optional):652390}    {AMBULATORY ATTESTATION (Optional):485039}        Phone call duration: *** minutes

## 2023-08-17 NOTE — PROGRESS NOTES
Minnie is a 52 year old female being evaluated via a billable phone visit, and would like to be contacted via the following  Cell phone/Mobile:   Telephone Information:   Mobile [phone]       ASSESSMENT and PLAN:  1. Subacute maxillary sinusitis  Approximately 50% treated.  Insurance would not cover guaifenesin or antitussives.  Repeat azithromycin which was effective by history.  Short course low-dose prednisone which should be affordable  - azithromycin (ZITHROMAX) 500 MG tablet; Take 1 tablet (500 mg) by mouth daily  Dispense: 7 tablet; Refill: 0  - predniSONE (DELTASONE) 10 MG tablet; Take 1 tablet (10 mg) by mouth every morning for 4 days  Dispense: 4 tablet; Refill: 0       Patient Instructions   Repeat azithromycin 500 mg daily for 7 days    Prednisone 10 mg daily for 4 days            Return if symptoms worsen or fail to improve, for using a video visit.         CHIEF COMPLAINT:  Chief Complaint   Patient presents with    Recheck Medication    Sinus Problem     Still having symptoms           8/17/2023     8:48 AM   Additional Questions   Roomed by hortencia bellamy       HISTORY OF PRESENT ILLNESS:  Minnie is a 52 year old female contacting the clinic today via phone for request for further antibiotics.  When we spoke on August 4 she was prescribed azithromycin.  She reports that this improves her symptoms approximately 50% and she is left with all of her symptoms, but less intense.  She was unable to afford the guaifenesin benzonatate and codeine as insurance did not cover.  She is still having inflammation and coughing and we discussed the benefits of low-dose prednisone for a few days.  She otherwise feels well    Sinus Problem       REVIEW OF SYSTEMS:  No fever    PFSH:  Social History     Social History Narrative    Not on file     Fixed income    TOBACCO USE:  History   Smoking Status    Never   Smokeless Tobacco    Never       VITALS:  There were no vitals filed for this visit.  LMP  (LMP Unknown)  Estimated  "body mass index is 34.67 kg/m  as calculated from the following:    Height as of 2/20/23: 1.588 m (5' 2.5\").    Weight as of 2/20/23: 87.4 kg (192 lb 9.6 oz).    PHYSICAL EXAM:  (observations via Phone)  Pushed speech.  Stutters    MEDICATIONS  Current Outpatient Medications   Medication Sig Dispense Refill    ascorbic acid (VITAMIN C) 1000 MG tablet [ASCORBIC ACID (VITAMIN C) 1000 MG TABLET] Take 1,000 mg by mouth daily after supper.       azithromycin (ZITHROMAX) 500 MG tablet Take 1 tablet (500 mg) by mouth daily 7 tablet 0    benzonatate (TESSALON) 200 MG capsule Take 1 capsule (200 mg) by mouth 3 times daily as needed for cough 20 capsule 1    biotin 10 mg Tab [BIOTIN 10 MG TAB] Take 1 tablet by mouth daily after supper.       cholecalciferol, vitamin D3, (VITAMIN D3) 2,000 unit cap [CHOLECALCIFEROL, VITAMIN D3, (VITAMIN D3) 2,000 UNIT CAP] Take 1 tablet by mouth daily after supper.       clotrimazole-betamethasone (LOTRISONE) 1-0.05 % external cream Apply topically 2 times daily 45 g 1    fish oil-omega-3 fatty acids 1000 MG capsule Take 2 g by mouth daily Patient reports taking 3000 MG      guaiFENesin (MUCINEX) 600 MG 12 hr tablet Take 1 tablet (600 mg) by mouth 2 times daily 60 tablet 2    guaiFENesin-codeine (ROBITUSSIN AC) 100-10 MG/5ML solution Take 5-10 mLs by mouth every 6 hours as needed for cough 240 mL 0    lidocaine, viscous, (XYLOCAINE) 2 % solution Swish and swallow 15 mLs in mouth every 3 hours as needed for moderate pain Max 8 doses/24 hour period. 100 mL 3    MULTIVITAMIN (MULTIPLE VITAMIN ORAL) [MULTIVITAMIN (MULTIPLE VITAMIN ORAL)] Take 1 tablet by mouth daily after supper.       predniSONE (DELTASONE) 10 MG tablet Take 1 tablet (10 mg) by mouth every morning for 4 days 4 tablet 0    traZODone (DESYREL) 100 MG tablet 3 po qhs 270 tablet 1    venlafaxine (EFFEXOR XR) 150 MG 24 hr capsule [VENLAFAXINE (EFFEXOR-XR) 150 MG 24 HR CAPSULE] TAKE 1 CAPSULE(150 MG) BY MOUTH DAILY 90 capsule 1 "       Notes summarized:   Labs, x-rays, cardiology, GI tests reviewed:   Recent Labs   Lab Test 02/20/23  1250      POTASSIUM 4.9   CR 0.79   TSH 1.43     Lab Results   Component Value Date    [id number] Negative 06/21/2022     Lab Results   Component Value Date    CHOL 231 02/02/2021     New orders: No orders of the defined types were placed in this encounter.      Independent review of:    Patient would like to receive their AVS by Mail a copy    Jb Carson MD  [facility]    Phone-Visit Details  Type of service:  Phone Visit  Patient has given verbal consent to a Phone visit?  Yes  How would you like to obtain your AVS?  MyChart  Will anyone else be joining your phone visit, giving supplemental history? No    Originating location (pt location): Home    Distant Location (provider location):  Off-site    Phone Start Time: 9:11 AM  Phone End time:  9:20 AM  Conversation plus orders: 9 minutes  Dictation time:  3 minutes    The visit lasted a total of 12 minutes

## 2023-09-18 ENCOUNTER — VIRTUAL VISIT (OUTPATIENT)
Dept: INTERNAL MEDICINE | Facility: CLINIC | Age: 53
End: 2023-09-18
Payer: MEDICARE

## 2023-09-18 DIAGNOSIS — J01.00 SUBACUTE MAXILLARY SINUSITIS: Primary | ICD-10-CM

## 2023-09-18 PROBLEM — Z11.59 NEED FOR HEPATITIS C SCREENING TEST: Status: RESOLVED | Noted: 2023-02-20 | Resolved: 2023-09-18

## 2023-09-18 PROCEDURE — 99213 OFFICE O/P EST LOW 20 MIN: CPT | Mod: 95 | Performed by: INTERNAL MEDICINE

## 2023-09-18 RX ORDER — DOXYCYCLINE HYCLATE 100 MG
100 TABLET ORAL 2 TIMES DAILY
Qty: 20 TABLET | Refills: 0 | Status: SHIPPED | OUTPATIENT
Start: 2023-09-18 | End: 2023-09-28

## 2023-09-18 NOTE — PROGRESS NOTES
Minnie is a 52 year old female being evaluated via a billable phone visit, and would like to be contacted via the following  Home number [phone] (home)    ASSESSMENT and PLAN:  1. Subacute maxillary sinusitis  Only partial improvement to azithromycin x14 days.  Adjust antibiotics.  Trial of dual antibiotics to cover MRSA and anaerobes.  If no improvement in 72 hours would change to levofloxacin  - amoxicillin-clavulanate (AUGMENTIN) 875-125 MG tablet; Take 1 tablet by mouth 2 times daily for 10 days  Dispense: 20 tablet; Refill: 0  - doxycycline hyclate (VIBRA-TABS) 100 MG tablet; Take 1 tablet (100 mg) by mouth 2 times daily for 10 days  Dispense: 20 tablet; Refill: 0       Patient Instructions   Augmentin 875 mg twice daily for 10 days    Doxycycline 100 mg twice daily for 10 days    Contact us if 72 hours.  If dramatic improvement has not been achieved with change to levofloxacin 750 mg daily for 1 week            Return in about 6 months (around 3/18/2024) for using a video visit.         CHIEF COMPLAINT:  Chief Complaint   Patient presents with    URI           8/17/2023     8:48 AM   Additional Questions   Roomed by hortencia bellamy       HISTORY OF PRESENT ILLNESS:  Minnie is a 52 year old female contacting the clinic today via phone for complaints of persisting sinusitis.  She first contacted us on August 4 with 2 weeks of symptoms and was placed on azithromycin for 7 days.  This improved her symptoms approximately 20 to 30% and she was retreated with another week.  This improved her symptoms another 20%.  She now reports approximately 40% improvement but is still blowing yellowish-green phlegm and mucus.  She is able to breathe more and pass air through her nose but still clearly has an infection.  No allergies.  Minimal pulmonary symptoms      URI      REVIEW OF SYSTEMS:  No asthma    PFSH:  Social History     Social History Narrative    Not on file     Episcopalian    TOBACCO USE:  History   Smoking Status  "   Never   Smokeless Tobacco    Never       VITALS:  There were no vitals filed for this visit.  LMP  (LMP Unknown)  Estimated body mass index is 34.67 kg/m  as calculated from the following:    Height as of 2/20/23: 1.588 m (5' 2.5\").    Weight as of 2/20/23: 87.4 kg (192 lb 9.6 oz).    PHYSICAL EXAM:  (observations via Phone)  Alert and oriented.  Speech pushed    MEDICATIONS  Current Outpatient Medications   Medication Sig Dispense Refill    amoxicillin-clavulanate (AUGMENTIN) 875-125 MG tablet Take 1 tablet by mouth 2 times daily for 10 days 20 tablet 0    doxycycline hyclate (VIBRA-TABS) 100 MG tablet Take 1 tablet (100 mg) by mouth 2 times daily for 10 days 20 tablet 0    ascorbic acid (VITAMIN C) 1000 MG tablet [ASCORBIC ACID (VITAMIN C) 1000 MG TABLET] Take 1,000 mg by mouth daily after supper.       benzonatate (TESSALON) 200 MG capsule Take 1 capsule (200 mg) by mouth 3 times daily as needed for cough 20 capsule 1    biotin 10 mg Tab [BIOTIN 10 MG TAB] Take 1 tablet by mouth daily after supper.       cholecalciferol, vitamin D3, (VITAMIN D3) 2,000 unit cap [CHOLECALCIFEROL, VITAMIN D3, (VITAMIN D3) 2,000 UNIT CAP] Take 1 tablet by mouth daily after supper.       clotrimazole-betamethasone (LOTRISONE) 1-0.05 % external cream Apply topically 2 times daily 45 g 1    fish oil-omega-3 fatty acids 1000 MG capsule Take 2 g by mouth daily Patient reports taking 3000 MG      guaiFENesin (MUCINEX) 600 MG 12 hr tablet Take 1 tablet (600 mg) by mouth 2 times daily 60 tablet 2    guaiFENesin-codeine (ROBITUSSIN AC) 100-10 MG/5ML solution Take 5-10 mLs by mouth every 6 hours as needed for cough 240 mL 0    lidocaine, viscous, (XYLOCAINE) 2 % solution Swish and swallow 15 mLs in mouth every 3 hours as needed for moderate pain Max 8 doses/24 hour period. 100 mL 3    MULTIVITAMIN (MULTIPLE VITAMIN ORAL) [MULTIVITAMIN (MULTIPLE VITAMIN ORAL)] Take 1 tablet by mouth daily after supper.       traZODone (DESYREL) 100 MG tablet " 3 po qhs 270 tablet 1    venlafaxine (EFFEXOR XR) 150 MG 24 hr capsule [VENLAFAXINE (EFFEXOR-XR) 150 MG 24 HR CAPSULE] TAKE 1 CAPSULE(150 MG) BY MOUTH DAILY 90 capsule 1       Notes summarized: Phone notes  Labs, x-rays, cardiology, GI tests reviewed:   Recent Labs   Lab Test 02/20/23  1250      POTASSIUM 4.9   CR 0.79   TSH 1.43     Lab Results   Component Value Date    [id number] Negative 06/21/2022     Lab Results   Component Value Date    CHOL 231 02/02/2021     New orders: No orders of the defined types were placed in this encounter.      Independent review of:    Patient would like to receive their AVS by Mail a copy    Jb Carson MD  [facility]    Phone-Visit Details  Type of service:  Phone Visit  Patient has given verbal consent to a Phone visit?  Yes  How would you like to obtain your AVS?  MyChart  Will anyone else be joining your phone visit, giving supplemental history? No    Originating location (pt location): Home    Distant Location (provider location):  Off-site    Phone Start Time: 2:38 PM  Phone End time:  2:49 PM  Conversation plus orders:  11 minutes  Dictation time:  3 minutes    The visit lasted a total of 14 minutes

## 2023-09-18 NOTE — PATIENT INSTRUCTIONS
Augmentin 875 mg twice daily for 10 days    Doxycycline 100 mg twice daily for 10 days    Contact us if 72 hours.  If dramatic improvement has not been achieved with change to levofloxacin 750 mg daily for 1 week

## 2023-11-23 DIAGNOSIS — F51.04 PSYCHOPHYSIOLOGICAL INSOMNIA: ICD-10-CM

## 2023-11-23 DIAGNOSIS — F43.10 POSTTRAUMATIC STRESS DISORDER: ICD-10-CM

## 2023-11-24 RX ORDER — TRAZODONE HYDROCHLORIDE 100 MG/1
TABLET ORAL
Qty: 270 TABLET | Refills: 0 | Status: SHIPPED | OUTPATIENT
Start: 2023-11-24 | End: 2023-11-27

## 2023-11-24 RX ORDER — TRAZODONE HYDROCHLORIDE 100 MG/1
TABLET ORAL
Qty: 270 TABLET | Refills: 1 | OUTPATIENT
Start: 2023-11-24

## 2023-11-24 NOTE — TELEPHONE ENCOUNTER
11/24 Spoke to patient and scheduled a follow up appointment with Dr. Holt 1/8. I informed the patient she must come to her appointment, patient still requesting med refill.

## 2023-11-27 DIAGNOSIS — F51.04 PSYCHOPHYSIOLOGICAL INSOMNIA: ICD-10-CM

## 2023-11-27 DIAGNOSIS — F43.10 POSTTRAUMATIC STRESS DISORDER: ICD-10-CM

## 2023-11-27 RX ORDER — TRAZODONE HYDROCHLORIDE 100 MG/1
TABLET ORAL
Qty: 270 TABLET | Refills: 0 | Status: SHIPPED | OUTPATIENT
Start: 2023-11-27 | End: 2024-03-21

## 2024-01-11 ENCOUNTER — OFFICE VISIT (OUTPATIENT)
Dept: FAMILY MEDICINE | Facility: CLINIC | Age: 54
End: 2024-01-11
Payer: MEDICARE

## 2024-01-11 VITALS
DIASTOLIC BLOOD PRESSURE: 86 MMHG | OXYGEN SATURATION: 97 % | HEIGHT: 63 IN | BODY MASS INDEX: 33.31 KG/M2 | SYSTOLIC BLOOD PRESSURE: 128 MMHG | WEIGHT: 188 LBS | HEART RATE: 94 BPM | TEMPERATURE: 97.6 F | RESPIRATION RATE: 18 BRPM

## 2024-01-11 DIAGNOSIS — F33.2 SEVERE RECURRENT MAJOR DEPRESSION WITHOUT PSYCHOTIC FEATURES (H): ICD-10-CM

## 2024-01-11 DIAGNOSIS — J01.90 ACUTE NON-RECURRENT SINUSITIS, UNSPECIFIED LOCATION: Primary | ICD-10-CM

## 2024-01-11 PROCEDURE — 99213 OFFICE O/P EST LOW 20 MIN: CPT | Performed by: NURSE PRACTITIONER

## 2024-01-11 ASSESSMENT — PATIENT HEALTH QUESTIONNAIRE - PHQ9
SUM OF ALL RESPONSES TO PHQ QUESTIONS 1-9: 5
SUM OF ALL RESPONSES TO PHQ QUESTIONS 1-9: 5
10. IF YOU CHECKED OFF ANY PROBLEMS, HOW DIFFICULT HAVE THESE PROBLEMS MADE IT FOR YOU TO DO YOUR WORK, TAKE CARE OF THINGS AT HOME, OR GET ALONG WITH OTHER PEOPLE: NOT DIFFICULT AT ALL

## 2024-01-11 NOTE — PROGRESS NOTES
"  Assessment & Plan     Acute non-recurrent sinusitis, unspecified location  Possible the start of a sinus infection. Encouraged patient to do nedi pot rinses first, and in the absence of sinus pressure/pain, headaches, fever- do not start antibiotic yet - Start if symptoms not improving in the next 3 days.    - amoxicillin-clavulanate (AUGMENTIN) 875-125 MG tablet  Dispense: 20 tablet; Refill: 0    Severe recurrent major depression without psychotic features (H)  Mood stable on venlafaxine and trazodone               BMI:   Estimated body mass index is 33.3 kg/m  as calculated from the following:    Height as of this encounter: 1.6 m (5' 3\").    Weight as of this encounter: 85.3 kg (188 lb).           CAROLINE Nj CNP  [facility]   Minnie is a 53 year old, presenting for the following health issues:  Sinus Problem (Pt c/o s/s sinus infection for last week.)        1/11/2024    12:45 PM   Additional Questions   Roomed by Stanislav AIKEN RN     Symptoms started about 1 week ago: sore throat, can feel mucus building up, congestion,  dark yellow/brown mucus from nose. No fever, chills, sinus pressure or headaches.  Reports having sinus infections since being in her 20's and this is how it feels. Maybe has 1-2 sinus infections in the year. Keeps head covered to avoid getting sick.  Taking vitamin C      History of Present Illness       Reason for visit:  Sinus Infection  Symptom onset:  3-7 days ago  Symptom intensity:  Moderate  Symptom progression:  Worsening  Had these symptoms before:  Yes  Has tried/received treatment for these symptoms:  Yes  Previous treatment was successful:  Yes  Prior treatment description:  Amoxicillin  What makes it worse:  Nothing  What makes it better:  Amoxicillin    She eats 2-3 servings of fruits and vegetables daily.She consumes 1 sweetened beverage(s) daily.She exercises with enough effort to increase her heart rate 9 or less minutes per " "day.  She exercises with enough effort to increase her heart rate 4 days per week.   She is taking medications regularly.                 Review of Systems   See HPI      Objective    /86 (BP Location: Left arm, Patient Position: Sitting, Cuff Size: Adult Regular)   Pulse 94   Temp 97.6  F (36.4  C) (Tympanic)   Resp 18   Ht 1.6 m (5' 3\")   Wt 85.3 kg (188 lb)   LMP  (LMP Unknown)   SpO2 97%   BMI 33.30 kg/m    Body mass index is 33.3 kg/m .  Physical Exam   GENERAL: healthy, alert and no distress  EYES: Eyes grossly normal to inspection, PERRL and conjunctivae and sclerae normal  HENT: ear canals and TM's normal, nose and mouth without ulcers or lesions  NECK: no adenopathy, no asymmetry, masses, or scars and thyroid normal to palpation  RESP: lungs clear to auscultation - no rales, rhonchi or wheezes  CV: regular rate and rhythm, normal S1 S2, no S3 or S4, no murmur, click or rub, no peripheral edema and peripheral pulses strong  ABDOMEN: soft, nontender, no hepatosplenomegaly, no masses and bowel sounds normal  MS: no gross musculoskeletal defects noted, no edema                      "

## 2024-01-11 NOTE — COMMUNITY RESOURCES LIST (ENGLISH)
01/11/2024   [facility]  N/A  For questions about this resource list or additional care needs, please contact your primary care clinic or care manager.  Phone: [phone]   Email: N/A   Address: [address] [place] [id number]   Hours: N/A        Food and Nutrition       Food pantry  1  [employer] [place] [address] [employer] Distance: 1.49 miles      In-Person   [address] [place] [address]  Language: English  Hours: Tue - Wed 9:00 AM - 2:00 PM  Fees: Free   Phone: (192) 423-2927 Website: http://www.jlfgrwciqqy10.org/?zone=/unionactive/view_article.cfm&IhhhNR=214108&page=Katpna66Sppmyonj     2  [facility] Food Shelf Distance: 1.73 miles      In-Person, Pick   [address] [place] [address]  Language: Sinhala, English, Croatian, Oromo, [place] Islander, Korean  Hours: Mon 11:00 AM - 3:00 PM , Tue - Wed 2:00 PM - 6:00 PM , Thu 11:00 AM - 3:00 PM  Fees: Free   Phone: (502) 652-8198 Email: agencyinfo@[employer].org Website: https://www.[facility].org/metrofoodprograms     SNAP application assistance  3  [facility] - SNAP Ed - SNAP Ed - SNAP application assistance Distance: 0.88 miles      In-Person   [address] [place] [address]  Language: English, Hmong, Oromo, [place] Islander, Korean  Hours: Mon - Fri 9:00 AM - 5:00 PM Appt. Only  Fees: Free   Phone: (763) 971-6775 Email: mnext@[facility] Website: https://extension.[facility]/nutrition-education/snap-ed-educational-offerings     4  [facility] Distance: 2.79 miles      Phone/Virtual   [address] [place] [address]  Language: English, [place] Islander  Hours: Mon - Fri 9:00 AM - 8:00 PM  Fees: Free   Phone: (515) 805-7623 Website: https://www.[facility].org/sandra     [employer] kitchen or free meals  5  [employer] Distance: 2.95 miles      [address] [address]   Language: English  Hours: Mon 12:00 PM - 2:00 PM , Wed 12:00 PM - 2:00 PM  Fees: Free   Phone: (531) 484-1056 Ext.4 Email: info@[employer] Website: http://www.[employer]     6  [facility] Distance: 3.22 miles      [address] [address]  Language: English, Serbian  Hours: Mon - Thu 5:15 PM - 6:15 PM  Fees: Free   Phone: (199) 922-2850 Ext.214 Email: ephhzettda8326@[employer] Website: http://www.[employer]/          Important Numbers & Websites       Emergency Services   [address]  [place] [address]  Poison Control   (926) 279-7319  Suicide Prevention Lifeline   (207) 532-1367 (TALK)  Child Abuse Hotline   (469) 752-8946 (4-A-Child)  Sexual Assault Hotline   (531) 929-7581 (HOPE)  National Runaway Safeline   (284) 719-7641 (RUNAWAY)  All-Options Talkline   (349) 808-6623  Substance Abuse Referral   (710) 367-3966 (HELP)

## 2024-02-16 ENCOUNTER — OFFICE VISIT (OUTPATIENT)
Dept: FAMILY MEDICINE | Facility: CLINIC | Age: 54
End: 2024-02-16
Payer: MEDICARE

## 2024-02-16 VITALS
DIASTOLIC BLOOD PRESSURE: 70 MMHG | RESPIRATION RATE: 16 BRPM | WEIGHT: 188.2 LBS | TEMPERATURE: 98.6 F | OXYGEN SATURATION: 99 % | SYSTOLIC BLOOD PRESSURE: 124 MMHG | HEIGHT: 62 IN | HEART RATE: 92 BPM | BODY MASS INDEX: 34.63 KG/M2

## 2024-02-16 DIAGNOSIS — B37.2 INTERTRIGINOUS CANDIDIASIS: ICD-10-CM

## 2024-02-16 PROCEDURE — 99213 OFFICE O/P EST LOW 20 MIN: CPT

## 2024-02-16 RX ORDER — CLOTRIMAZOLE AND BETAMETHASONE DIPROPIONATE 10; .64 MG/G; MG/G
CREAM TOPICAL 2 TIMES DAILY
Qty: 45 G | Refills: 1 | Status: SHIPPED | OUTPATIENT
Start: 2024-02-16 | End: 2024-03-17

## 2024-02-16 ASSESSMENT — PAIN SCALES - GENERAL: PAINLEVEL: NO PAIN (0)

## 2024-02-16 NOTE — PATIENT INSTRUCTIONS
The irritation of your right foot is likely related to fungal infection of your skin.  As we discussed today, this has been treated well in the past with an antifungal topical medication called Lotrisone.    I have represcribed this medication today.  You can apply it twice daily to clean and dry feet affected area.    I would like you to continue to monitor your feet for signs of worsening symptoms or failure of improvement.  Please follow-up with your primary care provider in about 3 to 4 weeks if symptoms worsen or do not improve with the current plan of care    Seek immediate medical attention with symptoms including loss of sensation in the foot, swelling, redness, fever, drainage, or open sores in the feet

## 2024-02-16 NOTE — PROGRESS NOTES
Assessment & Plan     (B37.2) Intertriginous candidiasis  Comment: Chronic concern with acute episode.  No signs of acute infection, open sores or lesions, or significant drainage from the site.  Peripheral pulses and cap intact.  Minor localized flare of clinical concern of left that has previously been well-managed with Lotrisone.  Will represcribe this medication today with instruction for close monitoring and follow-up as needed.  Plan: clotrimazole-betamethasone (LOTRISONE) 1-0.05 %        external cream      Prescription drug management  I spent a total of 10 minutes on the day of the visit.   Time spent by me doing chart review, history and exam, documentation and further activities per the note      FUTURE APPOINTMENTS:       - Follow-up visit in 3 to 4 weeks with PCP if symptoms worsen or do not improve       - Follow-up for annual visit or as needed  Patient Instructions   The irritation of your right foot is likely related to fungal infection of your skin.  As we discussed today, this has been treated well in the past with an antifungal topical medication called Lotrisone.    I have represcribed this medication today.  You can apply it twice daily to clean and dry feet affected area.    I would like you to continue to monitor your feet for signs of worsening symptoms or failure of improvement.  Please follow-up with your primary care provider in about 3 to 4 weeks if symptoms worsen or do not improve with the current plan of care    Seek immediate medical attention with symptoms including loss of sensation in the foot, swelling, redness, fever, drainage, or open sores in the feet    Yanci Hartman is a 53 year old, presenting for the following health issues:  OFFICE VISIT and Recheck Medication (PT REPORTS THAT SHE HAS SOME CONCERNS REGARDING HER LEFT FOOT, TOES DISCOLORATION AND POSSIBLE GROWTH BETWEEN TOES)      2/16/2024    12:19 PM   Additional Questions   Roomed by KIT   Accompanied by NOEMY          2/16/2024    12:19 PM   Patient Reported Additional Medications   Patient reports taking the following new medications NONE     History of Present Illness       Reason for visit:  Foot  Symptom onset:  More than a month  Symptoms include:  Athletes foot corns between toes  Symptom intensity:  Moderate  Symptom progression:  Staying the same  Had these symptoms before:  Yes  Has tried/received treatment for these symptoms:  Yes  Previous treatment was successful:  Yes    She eats 2-3 servings of fruits and vegetables daily.She consumes 1 sweetened beverage(s) daily.She exercises with enough effort to increase her heart rate 10 to 19 minutes per day.    She is taking medications regularly.     Minnie is a 53-year-old female with a past medical history significant for insomnia, depression, PTSD, anxiety, anemia, vitiligo, restless leg syndrome, hypercholesterolemia, vitamin D deficiency, and neck pain presents today with concerns for ongoing athlete's foot symptoms.  Patient reports that this has been an ongoing concern for her for years, and she generally manages with with topical Lotrisone.  She reports that she has frequent.  This that are completely asymptomatic, but for the last month or so she has been experiencing some dry, flaky, and irritated skin between her toes on her left foot.  She declines any cracking, open sores or lesions, or drainage at the site.  She declines any swelling, heat, or redness at the site.  She declines any numbness or tingling in her feet.  She reports that due to the fact that she does not have any Lotrisone currently she has been trying Vicks on her feet to manage some of the discomfort.  She notes that this concern is localized to her left foot, and has no concerns about her right foot.      Review of Systems  Constitutional, HEENT, cardiovascular, pulmonary, gi and gu systems are negative, except as otherwise noted.      Objective    /70 (BP Location: Left arm, Patient  "Position: Sitting, Cuff Size: Adult Regular)   Pulse 92   Temp 98.6  F (37  C) (Tympanic)   Resp 16   Ht 1.562 m (5' 1.5\")   Wt 85.4 kg (188 lb 3.2 oz)   LMP  (LMP Unknown)   SpO2 99%   Breastfeeding No   BMI 34.98 kg/m    Body mass index is 34.98 kg/m .  Physical Exam   GENERAL: alert and no distress  NECK: no adenopathy, no asymmetry, masses, or scars  RESP: lungs clear to auscultation - no rales, rhonchi or wheezes  CV: regular rate and rhythm, normal S1 S2, no S3 or S4, no murmur, click or rub, no peripheral edema  ABDOMEN: soft, nontender, no hepatosplenomegaly, no masses and bowel sounds normal  MS: no gross musculoskeletal defects noted, no edema  SKIN: Dry and flaky skin between toes of left foot most significant area being between the fourth and fifth toe.  Small corn at the lateral outer aspect of the fourth toe.  No drainage, open sores, or signs of infection    Ewa Hough DNP FNP-C  Family Nurse Practitioner - Same Day Provider  [facility]          Signed Electronically by: CAROLINE Arnold CNP    "

## 2024-03-21 ENCOUNTER — TELEPHONE (OUTPATIENT)
Dept: FAMILY MEDICINE | Facility: CLINIC | Age: 54
End: 2024-03-21
Payer: MEDICARE

## 2024-03-21 DIAGNOSIS — F43.10 POSTTRAUMATIC STRESS DISORDER: ICD-10-CM

## 2024-03-21 DIAGNOSIS — F33.2 SEVERE RECURRENT MAJOR DEPRESSION WITHOUT PSYCHOTIC FEATURES (H): ICD-10-CM

## 2024-03-21 DIAGNOSIS — F51.04 PSYCHOPHYSIOLOGICAL INSOMNIA: ICD-10-CM

## 2024-03-21 RX ORDER — TRAZODONE HYDROCHLORIDE 100 MG/1
TABLET ORAL
Qty: 270 TABLET | Refills: 0 | Status: SHIPPED | OUTPATIENT
Start: 2024-03-21 | End: 2024-07-31

## 2024-03-21 RX ORDER — VENLAFAXINE HYDROCHLORIDE 150 MG/1
CAPSULE, EXTENDED RELEASE ORAL
Qty: 90 CAPSULE | Refills: 0 | Status: SHIPPED | OUTPATIENT
Start: 2024-03-21 | End: 2024-06-17

## 2024-03-21 NOTE — TELEPHONE ENCOUNTER
Medication Question or Refill    Contacts         Type Contact Phone/Fax    03/21/2024 10:00 AM CDT Phone (Incoming) Minnie Aguilar (Self) [phone] (M)            What medication are you calling about (include dose and sig)?: traZODone (DESYREL) 100 MG tablet  venlafaxine (EFFEXOR XR) 150 MG 24 hr capsule     Preferred Pharmacy:     [id number] - [place], [place] - [address] [facility] [address]  [facility] [address] [place] [address]  Phone: [phone] Fax: [phone]      Controlled Substance Agreement on file:   CSA -- Patient Level:    CSA: None found at the patient level.       Who prescribed the medication?: PCP     Do you need a refill? Yes    When did you use the medication last? Unknown     Patient offered an appointment? No    Do you have any questions or concerns?  No      Okay to leave a detailed message?: Yes at Cell number on file:    Telephone Information:   Mobile [phone]

## 2024-06-17 DIAGNOSIS — F33.2 SEVERE RECURRENT MAJOR DEPRESSION WITHOUT PSYCHOTIC FEATURES (H): ICD-10-CM

## 2024-06-17 RX ORDER — VENLAFAXINE HYDROCHLORIDE 150 MG/1
CAPSULE, EXTENDED RELEASE ORAL
Qty: 30 CAPSULE | Refills: 0 | Status: SHIPPED | OUTPATIENT
Start: 2024-06-17 | End: 2024-07-17

## 2024-06-17 RX ORDER — VENLAFAXINE HYDROCHLORIDE 150 MG/1
CAPSULE, EXTENDED RELEASE ORAL
Qty: 90 CAPSULE | Refills: 0 | OUTPATIENT
Start: 2024-06-17

## 2024-06-17 NOTE — TELEPHONE ENCOUNTER
Pt has appt scheduled 07/15/24 - if you want her to be seen sooner you have same days and approvals open this week - advised pt that she needs to be seen first  - up to you if you want to fill her until 07/15 or move her appt

## 2024-07-17 DIAGNOSIS — F33.2 SEVERE RECURRENT MAJOR DEPRESSION WITHOUT PSYCHOTIC FEATURES (H): ICD-10-CM

## 2024-07-17 RX ORDER — VENLAFAXINE HYDROCHLORIDE 150 MG/1
CAPSULE, EXTENDED RELEASE ORAL
Qty: 30 CAPSULE | Refills: 0 | Status: SHIPPED | OUTPATIENT
Start: 2024-07-17 | End: 2024-07-31

## 2024-07-31 ENCOUNTER — OFFICE VISIT (OUTPATIENT)
Dept: FAMILY MEDICINE | Facility: CLINIC | Age: 54
End: 2024-07-31
Payer: MEDICARE

## 2024-07-31 VITALS
SYSTOLIC BLOOD PRESSURE: 135 MMHG | DIASTOLIC BLOOD PRESSURE: 82 MMHG | BODY MASS INDEX: 33.92 KG/M2 | HEART RATE: 98 BPM | WEIGHT: 184.3 LBS | HEIGHT: 62 IN | RESPIRATION RATE: 24 BRPM | TEMPERATURE: 97.8 F | OXYGEN SATURATION: 98 %

## 2024-07-31 DIAGNOSIS — Z13.21 SCREENING FOR ENDOCRINE, NUTRITIONAL, METABOLIC AND IMMUNITY DISORDER: ICD-10-CM

## 2024-07-31 DIAGNOSIS — Z00.00 MEDICARE ANNUAL WELLNESS VISIT, SUBSEQUENT: Primary | ICD-10-CM

## 2024-07-31 DIAGNOSIS — Z13.0 SCREENING FOR ENDOCRINE, NUTRITIONAL, METABOLIC AND IMMUNITY DISORDER: ICD-10-CM

## 2024-07-31 DIAGNOSIS — Z23 NEED FOR TDAP VACCINATION: ICD-10-CM

## 2024-07-31 DIAGNOSIS — Z13.29 SCREENING FOR ENDOCRINE, NUTRITIONAL, METABOLIC AND IMMUNITY DISORDER: ICD-10-CM

## 2024-07-31 DIAGNOSIS — E78.00 HYPERCHOLESTEROLEMIA: ICD-10-CM

## 2024-07-31 DIAGNOSIS — F33.2 SEVERE RECURRENT MAJOR DEPRESSION WITHOUT PSYCHOTIC FEATURES (H): ICD-10-CM

## 2024-07-31 DIAGNOSIS — F43.10 POSTTRAUMATIC STRESS DISORDER: ICD-10-CM

## 2024-07-31 DIAGNOSIS — Z13.228 SCREENING FOR ENDOCRINE, NUTRITIONAL, METABOLIC AND IMMUNITY DISORDER: ICD-10-CM

## 2024-07-31 DIAGNOSIS — Z12.31 VISIT FOR SCREENING MAMMOGRAM: ICD-10-CM

## 2024-07-31 DIAGNOSIS — F51.04 PSYCHOPHYSIOLOGICAL INSOMNIA: ICD-10-CM

## 2024-07-31 DIAGNOSIS — Z23 NEED FOR PROPHYLACTIC VACCINATION AGAINST HEPATITIS B VIRUS: ICD-10-CM

## 2024-07-31 PROCEDURE — 99214 OFFICE O/P EST MOD 30 MIN: CPT | Mod: 25 | Performed by: FAMILY MEDICINE

## 2024-07-31 PROCEDURE — G0439 PPPS, SUBSEQ VISIT: HCPCS | Performed by: FAMILY MEDICINE

## 2024-07-31 RX ORDER — VENLAFAXINE HYDROCHLORIDE 150 MG/1
CAPSULE, EXTENDED RELEASE ORAL
Qty: 90 CAPSULE | Refills: 3 | Status: SHIPPED | OUTPATIENT
Start: 2024-07-31

## 2024-07-31 RX ORDER — TRAZODONE HYDROCHLORIDE 100 MG/1
TABLET ORAL
Qty: 270 TABLET | Refills: 0 | Status: SHIPPED | OUTPATIENT
Start: 2024-07-31

## 2024-07-31 SDOH — HEALTH STABILITY: PHYSICAL HEALTH: ON AVERAGE, HOW MANY DAYS PER WEEK DO YOU ENGAGE IN MODERATE TO STRENUOUS EXERCISE (LIKE A BRISK WALK)?: 5 DAYS

## 2024-07-31 ASSESSMENT — PATIENT HEALTH QUESTIONNAIRE - PHQ9
SUM OF ALL RESPONSES TO PHQ QUESTIONS 1-9: 6
10. IF YOU CHECKED OFF ANY PROBLEMS, HOW DIFFICULT HAVE THESE PROBLEMS MADE IT FOR YOU TO DO YOUR WORK, TAKE CARE OF THINGS AT HOME, OR GET ALONG WITH OTHER PEOPLE: NOT DIFFICULT AT ALL
SUM OF ALL RESPONSES TO PHQ QUESTIONS 1-9: 6

## 2024-07-31 ASSESSMENT — PAIN SCALES - GENERAL: PAINLEVEL: NO PAIN (0)

## 2024-07-31 NOTE — PATIENT INSTRUCTIONS
-Recommended the corby CBT-I from [employer]. Described. Offered counseling that could help similarly.

## 2024-07-31 NOTE — PROGRESS NOTES
"Preventive Care Visit  [facility]  JANINE MENDES MD, Family Medicine  Jul 31, 2024    Assessment & Plan   Medicare wellness    Severe recurrent major depression without psychotic features (H)  The patient does not want to have therapy or change her medications. She feels she is doing well, though technically though she is still mildly depressed per the PHQ9.  - venlafaxine (EFFEXOR XR) 150 MG 24 hr capsule  Dispense: 90 capsule; Refill: 3    Posttraumatic stress disorder  Consider a sleep study. She denies nightmares.  - traZODone (DESYREL) 100 MG tablet  Dispense: 270 tablet; Refill: 0    Need for Tdap vaccination  - Tdap, tetanus-diptheria-acell pertussis, (BOOSTRIX) 5-2.5-18.5 LF-MCG/0.5 PAT injection  Dispense: 0.5 mL; Refill: 0    Need for prophylactic vaccination against hepatitis B virus  - hepatitis b vaccine recombinant (RECOMBIVAX-HB) 10 MCG/ML injection  Dispense: 1 mL; Refill: 0    Visit for screening mammogram  - MA Screening Bilateral w/ Long    Hypercholesterolemia  - Lipid panel reflex to direct LDL Non-fasting    Psychophysiological insomnia  - traZODone (DESYREL) 100 MG tablet  Dispense: 270 tablet; Refill: 0    Screening for endocrine, nutritional, metabolic and immunity disorder  - Comprehensive metabolic panel    She will return in six months.    BMI  Estimated body mass index is 33.28 kg/m  as calculated from the following:    Height as of this encounter: 1.585 m (5' 2.4\").    Weight as of this encounter: 83.6 kg (184 lb 4.8 oz).   Counseling  Appropriate preventive services were addressed with this patient via screening, questionnaire, or discussion as appropriate for fall prevention, nutrition, physical activity, Tobacco-use cessation, weight loss and cognition.  Checklist reviewing preventive services available has been given to the patient.  Reviewed patient's diet, addressing concerns and/or questions.   She is at risk for psychosocial distress and has been provided " with information to reduce risk.   The patient was provided with written information regarding signs of hearing loss.   Information on urinary incontinence and treatment options given to patient.   The patient's PHQ-9 score is consistent with mild depression. She was provided with information regarding depression.     Yanci Hartman is a 53 year old, presenting for the following:  Annual Visit (No questions or concern )        7/31/2024    12:03 PM   Additional Questions   Roomed by HERBERT Moura         Health Care Directive  Patient has a Health Care Directive on file  Discussed advance care planning with patient. She carries her [employer] Advanced Directive with her. It was copied for the chart. It identifies two friends as her PoA's.     Declines any counseling referrals. Trying to live a daily rhythm. As long as she takes her medication, she doesn't feel the PTSD.   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 Likes to walk.        7/31/2024   General Health   How would you rate your overall physical health? Good   Feel stress (tense, anxious, or unable to sleep) Only a little      (!) STRESS CONCERN      7/31/2024   Nutrition   Diet: Regular (no restrictions)          7/31/2024   Exercise   Days per week of moderate/strenous exercise 5 days          7/31/2024   Social Factors   Worry food won't last until get money to buy more No   Food not last or not have enough money for food? No   Do you have housing? (Housing is defined as stable permanent housing and does not include staying ouside in a car, in a tent, in an abandoned building, in an overnight shelter, or couch-surfing.) Yes   Are you worried about losing your housing? No   Lack of transportation? No   Unable to get utilities (heat,electricity)? No          7/31/2024   Fall Risk   Fallen 2 or more times in the past year? No   Trouble with walking or balance? No             7/31/2024   Activities of Daily Living- Home Safety   Needs help with the  following daily activites None of the above   Safety concerns in the home None of the above          7/31/2024   Dental   Dentist two times every year? Yes          7/31/2024   Hearing Screening   Hearing concerns? (!) I NEED TO ASK PEOPLE TO SPEAK UP OR REPEAT THEMSELVES.          7/31/2024   Driving Risk Screening   Patient/family members have concerns about driving No          7/31/2024   General Alertness/Fatigue Screening   Have you been more tired than usual lately? No          7/31/2024   Urinary Incontinence Screening   Bothered by leaking urine in past 6 months Yes          7/31/2024   TB Screening   Were you born outside of the [place]? No      Today's PHQ-9 Score:       7/31/2024    11:48 AM   PHQ-9 SCORE   PHQ-9 Total Score MyChart 6 (Mild depression)   PHQ-9 Total Score 6         7/31/2024   Substance Use   Alcohol more than 3/day or more than 7/wk No   Do you have a current opioid prescription? No   How severe/bad is pain from 1 to 10? 0/10 (No Pain)   Do you use any other substances recreationally? (!) OTHER      Social History     Tobacco Use    Smoking status: Never    Smokeless tobacco: Never   Vaping Use    Vaping status: Never Used   Substance Use Topics    Alcohol use: Yes     Alcohol/week: 8.0 standard drinks of alcohol     Types: 2 Glasses of wine, 6 Standard drinks or equivalent per week     Comment: Alcoholic Drinks/day: occasional     Drug use: No   Mammogram Screening - Mammogram every 1-2 years updated in Health Maintenance based on mutual decision making  History of abnormal Pap smear: No - age 30- 64 PAP with HPV every 5 years recommended        Latest Ref Rng & Units 2/20/2023    12:37 PM 6/8/2018     1:53 PM 5/25/2016    12:19 PM   PAP / HPV   PAP  Negative for Intraepithelial Lesion or Malignancy (NILM)  Negative for squamous intraepithelial lesion or malignancy  Electronically signed by Red Espinoza CT (ASCP) on 6/13/2018 at  9:43 AM    Atypical squamous cells of undetermined  significance  Electronically signed by Karel Cantu MD PhD on 6/2/2016 at  8:50 AM      HPV 16 DNA Negative Negative      HPV 18 DNA Negative Negative      Other HR HPV Negative Negative        ASCVD Risk   The 10-year ASCVD risk score (Jordan WILSON, et al., 2019) is: 4.8%    Values used to calculate the score:      Age: 53 years      Sex: Female      Is Non- : Yes      Diabetic: No      Tobacco smoker: No      Systolic Blood Pressure: 135 mmHg      Is BP treated: No      HDL Cholesterol: 51 mg/dL      Total Cholesterol: 231 mg/dL    Reviewed and updated as needed this visit by Provider                  Current providers sharing in care for this patient include:  Patient Care Team:  Nasrin Holt MD as PCP - General (Family Medicine)  Jb Carson MD as Assigned PCP    The following health maintenance items are reviewed in Epic and correct as of today:  Health Maintenance   Topic Date Due    DEPRESSION ACTION PLAN  Never done    HEPATITIS B IMMUNIZATION (1 of 3 - 19+ 3-dose series) Never done    MAMMO SCREENING  10/28/2021    MEDICARE ANNUAL WELLNESS VISIT  02/02/2022    LIPID  02/02/2022    DTAP/TDAP/TD IMMUNIZATION (2 - Td or Tdap) 04/23/2023    ANNUAL REVIEW OF HM ORDERS  02/20/2024    INFLUENZA VACCINE (1) 09/01/2024    PHQ-9  01/31/2025    ADVANCE CARE PLANNING  02/02/2026    GLUCOSE  02/20/2026    HPV TEST  02/20/2028    PAP  02/20/2028    COLORECTAL CANCER SCREENING  05/11/2031    HEPATITIS C SCREENING  Completed    HIV SCREENING  Completed    ZOSTER IMMUNIZATION  Completed    COVID-19 Vaccine  Completed    Pneumococcal Vaccine: Pediatrics (0 to 5 Years) and At-Risk Patients (6 to 64 Years)  Aged Out    IPV IMMUNIZATION  Aged Out    HPV IMMUNIZATION  Aged Out    MENINGITIS IMMUNIZATION  Aged Out    RSV MONOCLONAL ANTIBODY  Aged Out     Review of Systems  Constitutional, HEENT, cardiovascular, pulmonary, GI, , musculoskeletal, neuro, skin, endocrine and psych systems  "are negative, except as otherwise noted.     Objective    Exam  /82 (BP Location: Left arm, Patient Position: Sitting, Cuff Size: Adult Regular)   Pulse 98   Temp 97.8  F (36.6  C) (Tympanic)   Resp 24   Ht 1.585 m (5' 2.4\")   Wt 83.6 kg (184 lb 4.8 oz)   LMP  (LMP Unknown)   SpO2 98%   BMI 33.28 kg/m     Estimated body mass index is 33.28 kg/m  as calculated from the following:    Height as of this encounter: 1.585 m (5' 2.4\").    Weight as of this encounter: 83.6 kg (184 lb 4.8 oz).  Physical Exam  GENERAL: alert and no distress  EYES: Eyes grossly normal to inspection, PERRL and conjunctivae and sclerae normal  HENT: ear canals and TM's normal, nose and mouth without ulcers or lesions  NECK: no adenopathy, no asymmetry, masses, or scars  RESP: lungs clear to auscultation - no rales, rhonchi or wheezes  CV: regular rate and rhythm, normal S1 S2, no S3 or S4, no murmur, click or rub, no peripheral edema  ABDOMEN: soft, nontender, no hepatosplenomegaly, no masses and bowel sounds normal  MS: no gross musculoskeletal defects noted, no edema  SKIN: no suspicious lesions or rashes  NEURO: Normal strength and tone, mentation intact and speech normal  PSYCH: mentation appears normal, affect normal/bright      7/31/2024   Mini Cog   Clock Draw Score 2 Normal   3 Item Recall 3 objects recalled   Mini Cog Total Score 5      Signed Electronically by: JANINE MENDES MD    Answers submitted by the patient for this visit:  Patient Health Questionnaire (Submitted on 7/31/2024)  If you checked off any problems, how difficult have these problems made it for you to do your work, take care of things at home, or get along with other people?: Not difficult at all  PHQ9 TOTAL SCORE: 6    "

## 2024-08-12 ENCOUNTER — DOCUMENTATION ONLY (OUTPATIENT)
Dept: OTHER | Facility: CLINIC | Age: 54
End: 2024-08-12
Payer: MEDICARE

## 2024-11-29 ENCOUNTER — TELEPHONE (OUTPATIENT)
Dept: FAMILY MEDICINE | Facility: CLINIC | Age: 54
End: 2024-11-29
Payer: MEDICARE

## 2024-11-29 NOTE — TELEPHONE ENCOUNTER
"Nurse Triage encounter 11/25/24, Dr. Holt recommends \"Please have her schedule for January. That was our plan in July and it has not been scheduled yet.\"     Patient notified and states she will call back to schedule, as she was in the store at time of call.     Callback number: [phone]. Please confirm with patient if this number should be listed as her contact number?     Will route to follow up to ensure patient called back and appointment is scheduled.   "

## 2024-12-19 DIAGNOSIS — F43.10 POSTTRAUMATIC STRESS DISORDER: ICD-10-CM

## 2024-12-19 DIAGNOSIS — F51.04 PSYCHOPHYSIOLOGICAL INSOMNIA: ICD-10-CM

## 2024-12-19 RX ORDER — TRAZODONE HYDROCHLORIDE 100 MG/1
TABLET ORAL
Qty: 270 TABLET | Refills: 0 | Status: SHIPPED | OUTPATIENT
Start: 2024-12-19

## 2024-12-23 ENCOUNTER — TELEPHONE (OUTPATIENT)
Dept: FAMILY MEDICINE | Facility: CLINIC | Age: 54
End: 2024-12-23
Payer: MEDICARE

## 2024-12-23 NOTE — TELEPHONE ENCOUNTER
----- Message from JANINE HOLT sent at 12/20/2024 11:33 AM CST -----  Regarding: RE: Preventative care  Ah. Thanks for checking. She needs a med check six months from her last visit with me.   Warm regards,  Janine Holt MD  ----- Message -----  From: Mara Smith  Sent: 12/19/2024   3:54 PM CST  To: Janine Holt MD  Subject: FW: Preventative care                            Pt's last annual was 7/31/24, so we would have to schedule for 8/1/2025.  Do you need to see her before then?  ----- Message -----  From: Janine oHlt MD  Sent: 12/19/2024  12:43 PM CST  To: [facility] [place]  Subject: Preventative care                                Hello.  Please schedule her annual visit with me soon as appropriate for her.  Thanks.  Janine Holt MD

## 2024-12-23 NOTE — TELEPHONE ENCOUNTER
Unable to lm (phone rings a few times, then says that it is out of service), sent out letter to call and schedule a 6mo f/u with Dr. Holt from last appt on 7/31/24.

## 2025-03-26 ENCOUNTER — OFFICE VISIT (OUTPATIENT)
Dept: FAMILY MEDICINE | Facility: CLINIC | Age: 55
End: 2025-03-26
Payer: MEDICARE

## 2025-03-26 VITALS
WEIGHT: 189.8 LBS | DIASTOLIC BLOOD PRESSURE: 85 MMHG | HEIGHT: 62 IN | RESPIRATION RATE: 22 BRPM | OXYGEN SATURATION: 98 % | HEART RATE: 100 BPM | TEMPERATURE: 97 F | BODY MASS INDEX: 34.93 KG/M2 | SYSTOLIC BLOOD PRESSURE: 137 MMHG

## 2025-03-26 DIAGNOSIS — F33.2 SEVERE RECURRENT MAJOR DEPRESSION WITHOUT PSYCHOTIC FEATURES (H): ICD-10-CM

## 2025-03-26 DIAGNOSIS — F51.04 PSYCHOPHYSIOLOGICAL INSOMNIA: ICD-10-CM

## 2025-03-26 DIAGNOSIS — F43.10 POSTTRAUMATIC STRESS DISORDER: ICD-10-CM

## 2025-03-26 DIAGNOSIS — J32.9 CHRONIC SINUSITIS, UNSPECIFIED LOCATION: Primary | ICD-10-CM

## 2025-03-26 DIAGNOSIS — B35.3 TINEA PEDIS OF LEFT FOOT: ICD-10-CM

## 2025-03-26 PROCEDURE — 96127 BRIEF EMOTIONAL/BEHAV ASSMT: CPT

## 2025-03-26 PROCEDURE — 3079F DIAST BP 80-89 MM HG: CPT

## 2025-03-26 PROCEDURE — G2211 COMPLEX E/M VISIT ADD ON: HCPCS

## 2025-03-26 PROCEDURE — 99214 OFFICE O/P EST MOD 30 MIN: CPT

## 2025-03-26 PROCEDURE — 1126F AMNT PAIN NOTED NONE PRSNT: CPT

## 2025-03-26 PROCEDURE — 3075F SYST BP GE 130 - 139MM HG: CPT

## 2025-03-26 RX ORDER — TRAZODONE HYDROCHLORIDE 100 MG/1
TABLET ORAL
Qty: 270 TABLET | Refills: 0 | Status: SHIPPED | OUTPATIENT
Start: 2025-03-26

## 2025-03-26 RX ORDER — PREDNISONE 20 MG/1
20 TABLET ORAL DAILY
Qty: 5 TABLET | Refills: 0 | Status: SHIPPED | OUTPATIENT
Start: 2025-03-26

## 2025-03-26 ASSESSMENT — PATIENT HEALTH QUESTIONNAIRE - PHQ9
10. IF YOU CHECKED OFF ANY PROBLEMS, HOW DIFFICULT HAVE THESE PROBLEMS MADE IT FOR YOU TO DO YOUR WORK, TAKE CARE OF THINGS AT HOME, OR GET ALONG WITH OTHER PEOPLE: NOT DIFFICULT AT ALL
SUM OF ALL RESPONSES TO PHQ QUESTIONS 1-9: 7
SUM OF ALL RESPONSES TO PHQ QUESTIONS 1-9: 7

## 2025-03-26 ASSESSMENT — PAIN SCALES - GENERAL: PAINLEVEL_OUTOF10: NO PAIN (0)

## 2025-03-26 NOTE — PROGRESS NOTES
Assessment & Plan     Chronic sinusitis, unspecified location  History of frequent sinus infections with recent treatment with Augmentin. Sinuses nontender on exam. No adenopathy. Nasal mucous slightly edematous with some clear drainage. I do not think she requires another antibiotic at this time. We discussed steroid nasal spray to help with symptoms however she declined and prefers oral medication. Will do short course of prednisone. Counseled on medication.   - REVIEW OF HEALTH MAINTENANCE PROTOCOL ORDERS  - predniSONE (DELTASONE) 20 MG tablet  Dispense: 5 tablet; Refill: 0    Severe recurrent major depression without psychotic features (H)  Posttraumatic stress disorder  - traZODone (DESYREL) 100 MG tablet  Dispense: 270 tablet; Refill: 0  Psychophysiological insomnia  - traZODone (DESYREL) 100 MG tablet  Dispense: 270 tablet; Refill: 0    Well managed on current doses of trazodone, venlafaxine. Refill provided today.    Tinea pedis of left foot  Chronic issues that she thinks started after a pedicure. Keeps the area clean and dry. Has tried over the counter antifungal and lotrisone which has not resolved the problem completely. Requested podiatry referral today.   - Orthopedic  Referral    Plan to follow up if symptoms do not improve or worsen.        Yanci Hartman is a 54 year old, presenting for the following health issues:  Refill Request and Sinus Problem (Finished antibiotics (Augmentin) last week for a previous infection- per patient she is still having a lot of mucus drainage. She isn't sure if the infection is still present or if its related to allergies. )        3/26/2025    12:43 PM   Additional Questions   Roomed by HERBERT Moura     History of Present Illness       Reason for visit:  Prescription   She is taking medications regularly.      History of recurrent/chronic sinus infections since she was young.   Completed 10 day course of Augmentin - which usually works for her. Still have a  "little bit of mucous/drainage. Denies any headaches, sinus pressure/tenderness, tooth pain, fever, eye discharge, ear pain or pressure.     Has not tried any over the counter mediations.   Yo history of environmental/seasonal allergies.     Issues with chronic athletes foot - left foot/toe that has been there for years and will not improve with previous antifungal prescriptions - requesting to see podiatry.       Review of Systems  Constitutional, HEENT, cardiovascular, pulmonary, gi and gu systems are negative, except as otherwise noted.      Objective    /85 (BP Location: Left arm, Patient Position: Sitting, Cuff Size: Adult Regular)   Pulse 100   Temp 97  F (36.1  C) (Tympanic)   Resp 22   Ht 1.585 m (5' 2.4\")   Wt 86.1 kg (189 lb 12.8 oz)   LMP  (LMP Unknown)   SpO2 98%   BMI 34.27 kg/m    Body mass index is 34.27 kg/m .    Physical Exam   GENERAL: alert and no distress  EYES: Eyes grossly normal to inspection, PERRL and conjunctivae and sclerae normal  HENT: normal cephalic/atraumatic, ear canals and TM's normal, nasal mucosa edematous , rhinorrhea clear, and oral mucous membranes moist  NECK: no adenopathy, no asymmetry, masses, or scars  PSYCH: mentation appears normal, affect normal/bright          Signed Electronically by: CAROLINE Mclean CNP    "

## 2025-07-01 ENCOUNTER — PATIENT OUTREACH (OUTPATIENT)
Dept: CARE COORDINATION | Facility: CLINIC | Age: 55
End: 2025-07-01
Payer: MEDICARE

## 2025-07-15 ENCOUNTER — PATIENT OUTREACH (OUTPATIENT)
Dept: CARE COORDINATION | Facility: CLINIC | Age: 55
End: 2025-07-15
Payer: MEDICARE

## 2025-09-02 DIAGNOSIS — F43.10 POSTTRAUMATIC STRESS DISORDER: ICD-10-CM

## 2025-09-02 DIAGNOSIS — F51.04 PSYCHOPHYSIOLOGICAL INSOMNIA: ICD-10-CM

## 2025-09-02 DIAGNOSIS — F33.2 SEVERE RECURRENT MAJOR DEPRESSION WITHOUT PSYCHOTIC FEATURES (H): ICD-10-CM

## 2025-09-03 DIAGNOSIS — F33.2 SEVERE RECURRENT MAJOR DEPRESSION WITHOUT PSYCHOTIC FEATURES (H): ICD-10-CM

## 2025-09-03 RX ORDER — VENLAFAXINE HYDROCHLORIDE 150 MG/1
CAPSULE, EXTENDED RELEASE ORAL
Qty: 30 CAPSULE | Refills: 0 | Status: SHIPPED | OUTPATIENT
Start: 2025-09-03

## 2025-09-03 RX ORDER — TRAZODONE HYDROCHLORIDE 100 MG/1
TABLET ORAL
Qty: 90 TABLET | Refills: 0 | Status: SHIPPED | OUTPATIENT
Start: 2025-09-03

## 2025-09-03 RX ORDER — VENLAFAXINE HYDROCHLORIDE 150 MG/1
CAPSULE, EXTENDED RELEASE ORAL
Qty: 90 CAPSULE | Refills: 3 | Status: SHIPPED | OUTPATIENT
Start: 2025-09-03 | End: 2025-09-03